# Patient Record
Sex: MALE | Race: WHITE | NOT HISPANIC OR LATINO | Employment: OTHER | RURAL
[De-identification: names, ages, dates, MRNs, and addresses within clinical notes are randomized per-mention and may not be internally consistent; named-entity substitution may affect disease eponyms.]

---

## 2023-09-26 ENCOUNTER — OUTSIDE FACILITY SERVICE (OUTPATIENT)
Dept: CARDIOLOGY | Facility: CLINIC | Age: 68
End: 2023-09-26
Payer: MEDICARE

## 2023-10-17 ENCOUNTER — OFFICE VISIT (OUTPATIENT)
Dept: CARDIOLOGY | Facility: CLINIC | Age: 68
End: 2023-10-17
Payer: MEDICARE

## 2023-10-17 ENCOUNTER — PREP FOR SURGERY (OUTPATIENT)
Dept: OTHER | Facility: HOSPITAL | Age: 68
End: 2023-10-17
Payer: MEDICARE

## 2023-10-17 VITALS
DIASTOLIC BLOOD PRESSURE: 86 MMHG | WEIGHT: 203 LBS | SYSTOLIC BLOOD PRESSURE: 132 MMHG | HEIGHT: 72 IN | HEART RATE: 87 BPM | BODY MASS INDEX: 27.5 KG/M2

## 2023-10-17 DIAGNOSIS — I35.0 AORTIC STENOSIS, SEVERE: Primary | ICD-10-CM

## 2023-10-17 PROCEDURE — 99204 OFFICE O/P NEW MOD 45 MIN: CPT | Performed by: INTERNAL MEDICINE

## 2023-10-17 PROCEDURE — 1159F MED LIST DOCD IN RCRD: CPT | Performed by: INTERNAL MEDICINE

## 2023-10-17 PROCEDURE — 93000 ELECTROCARDIOGRAM COMPLETE: CPT | Performed by: INTERNAL MEDICINE

## 2023-10-17 PROCEDURE — 1160F RVW MEDS BY RX/DR IN RCRD: CPT | Performed by: INTERNAL MEDICINE

## 2023-10-17 RX ORDER — SODIUM CHLORIDE 0.9 % (FLUSH) 0.9 %
10 SYRINGE (ML) INJECTION EVERY 12 HOURS SCHEDULED
OUTPATIENT
Start: 2023-10-17

## 2023-10-17 RX ORDER — SODIUM CHLORIDE 9 MG/ML
40 INJECTION, SOLUTION INTRAVENOUS AS NEEDED
OUTPATIENT
Start: 2023-10-17

## 2023-10-17 RX ORDER — SODIUM CHLORIDE 0.9 % (FLUSH) 0.9 %
10 SYRINGE (ML) INJECTION AS NEEDED
OUTPATIENT
Start: 2023-10-17

## 2023-10-17 NOTE — PROGRESS NOTES
"Chief Complaint  aortic valve stenosis     Subjective            Timo Aceves presents to [facility] CARDIOLOGY  History of Present Illness    68-year-old white male.  He has no previous cardiac problems.  He reestablish primary care recently and had a murmur on exam.  He had an echocardiogram thereafter which showed severe calcific aortic stenosis with peak velocity 4.8 m/s, mean gradient 57 mmHg with normal LV function.  He is very active and works full-time.  He denies excessive shortness of breath or chest pain.  No dizziness or syncope.  He is on no medications.  Non-smoker.    PMH  History reviewed. No pertinent past medical history.      SURGICALHX  History reviewed. No pertinent surgical history.     SOC  Social History     Socioeconomic History    Marital status:    Tobacco Use    Smoking status: Never    Smokeless tobacco: Never   Vaping Use    Vaping Use: Never used   Substance and Sexual Activity    Alcohol use: Yes     Comment: occ    Drug use: Never    Sexual activity: Defer         FAMHX  Family History   Problem Relation Age of Onset    No Known Problems Mother     No Known Problems Father           ALLERGY  No Known Allergies     MEDSCURRENT  No current outpatient medications on file.      Review of Systems   Constitutional: Negative. Negative for malaise/fatigue.   HENT: Negative.     Eyes: Negative.    Cardiovascular:  Negative for chest pain, dyspnea on exertion, near-syncope and syncope.   Respiratory: Negative.  Negative for shortness of breath.    Endocrine: Negative.    Hematologic/Lymphatic: Negative.    Skin: Negative.    Musculoskeletal: Negative.    Gastrointestinal: Negative.    Genitourinary: Negative.    Neurological: Negative.    Psychiatric/Behavioral: Negative.          Objective     /86   Pulse 87   Ht 182.9 cm (72\")   Wt 92.1 kg (203 lb)   BMI 27.53 kg/m²       General Appearance:   well developed  well nourished  HENT:   oropharynx " moist  lips not cyanotic  Neck:  thyroid not enlarged  supple  Respiratory:  no respiratory distress  normal breath sounds  no rales  Cardiovascular:  no jugular venous distention  regular rhythm  apical impulse normal  S1 normal, S2 normal  no S3, no S4   Harsh parasternal systolic murmur  no rub, no thrill  carotid pulses normal; no bruit  pedal pulses normal  lower extremity edema: none    Musculoskeletal:  no clubbing of fingers.   normocephalic, head atraumatic  Skin:   warm, dry  Psychiatric:  judgement and insight appropriate  normal mood and affect      Result Review :             Data reviewed : Primary care records reviewed, laboratory studies reviewed, echo reviewed        ECG 12 Lead    Date/Time: 10/17/2023 10:16 AM  Performed by: MAVIS Hwang MD    Authorized by: MAVIS Hwang MD  Comparison: not compared with previous ECG   Previous ECG: no previous ECG available  Rhythm: sinus rhythm  Conduction: conduction normal  ST Segments: ST segments normal  T Waves: T waves normal  QRS axis: normal  Other findings: left ventricular hypertrophy    Clinical impression: non-specific ECG               Assessment and Plan        ASSESSMENT:  Encounter Diagnosis   Name Primary?    Aortic stenosis, severe Yes         PLAN:    1.  Mr. Aceves has severe aortic stenosis, heavily calcified valve and a harsh murmur on exam.  He does not have specific symptoms at this point.  Given his relatively young age, and degree of physical activity, combined with the valve measurements I think he definitely will require aortic valve replacement in the future.  I discussed at length with the patient and his wife the natural history of aortic valve stenosis and symptoms/complications that can arise in the future due to the effects of aortic stenosis.  I recommend cardiac catheterization to evaluate for significant coronary artery disease and then have outpatient surgical consultation.  Ultimately the patient will decide the  timing of surgery.  He is not enthusiastic about having surgery yet of course given he is asymptomatic but he will eventually become symptomatic based on the measurements and active lifestyle.  2.  The risk and benefits of coronary angiography were discussed with the patient he is agreeable to proceed.  He wants to schedule this for November 2  3.  Further follow-up to be determined          Patient was given instructions and counseling regarding his condition or for health maintenance advice. Please see specific information pulled into the AVS if appropriate.             MAVIS Hwang MD  10/17/2023    10:15 EDT

## 2023-10-17 NOTE — H&P (VIEW-ONLY)
"Chief Complaint  aortic valve stenosis     Subjective            Timo Aceves presents to [facility] CARDIOLOGY  History of Present Illness    68-year-old white male.  He has no previous cardiac problems.  He reestablish primary care recently and had a murmur on exam.  He had an echocardiogram thereafter which showed severe calcific aortic stenosis with peak velocity 4.8 m/s, mean gradient 57 mmHg with normal LV function.  He is very active and works full-time.  He denies excessive shortness of breath or chest pain.  No dizziness or syncope.  He is on no medications.  Non-smoker.    PMH  History reviewed. No pertinent past medical history.      SURGICALHX  History reviewed. No pertinent surgical history.     SOC  Social History     Socioeconomic History    Marital status:    Tobacco Use    Smoking status: Never    Smokeless tobacco: Never   Vaping Use    Vaping Use: Never used   Substance and Sexual Activity    Alcohol use: Yes     Comment: occ    Drug use: Never    Sexual activity: Defer         FAMHX  Family History   Problem Relation Age of Onset    No Known Problems Mother     No Known Problems Father           ALLERGY  No Known Allergies     MEDSCURRENT  No current outpatient medications on file.      Review of Systems   Constitutional: Negative. Negative for malaise/fatigue.   HENT: Negative.     Eyes: Negative.    Cardiovascular:  Negative for chest pain, dyspnea on exertion, near-syncope and syncope.   Respiratory: Negative.  Negative for shortness of breath.    Endocrine: Negative.    Hematologic/Lymphatic: Negative.    Skin: Negative.    Musculoskeletal: Negative.    Gastrointestinal: Negative.    Genitourinary: Negative.    Neurological: Negative.    Psychiatric/Behavioral: Negative.          Objective     /86   Pulse 87   Ht 182.9 cm (72\")   Wt 92.1 kg (203 lb)   BMI 27.53 kg/m²       General Appearance:   well developed  well nourished  HENT:   oropharynx " moist  lips not cyanotic  Neck:  thyroid not enlarged  supple  Respiratory:  no respiratory distress  normal breath sounds  no rales  Cardiovascular:  no jugular venous distention  regular rhythm  apical impulse normal  S1 normal, S2 normal  no S3, no S4   Harsh parasternal systolic murmur  no rub, no thrill  carotid pulses normal; no bruit  pedal pulses normal  lower extremity edema: none    Musculoskeletal:  no clubbing of fingers.   normocephalic, head atraumatic  Skin:   warm, dry  Psychiatric:  judgement and insight appropriate  normal mood and affect      Result Review :             Data reviewed : Primary care records reviewed, laboratory studies reviewed, echo reviewed        ECG 12 Lead    Date/Time: 10/17/2023 10:16 AM  Performed by: MAVIS Hwang MD    Authorized by: MAVIS Hwang MD  Comparison: not compared with previous ECG   Previous ECG: no previous ECG available  Rhythm: sinus rhythm  Conduction: conduction normal  ST Segments: ST segments normal  T Waves: T waves normal  QRS axis: normal  Other findings: left ventricular hypertrophy    Clinical impression: non-specific ECG               Assessment and Plan        ASSESSMENT:  Encounter Diagnosis   Name Primary?    Aortic stenosis, severe Yes         PLAN:    1.  Mr. Aceves has severe aortic stenosis, heavily calcified valve and a harsh murmur on exam.  He does not have specific symptoms at this point.  Given his relatively young age, and degree of physical activity, combined with the valve measurements I think he definitely will require aortic valve replacement in the future.  I discussed at length with the patient and his wife the natural history of aortic valve stenosis and symptoms/complications that can arise in the future due to the effects of aortic stenosis.  I recommend cardiac catheterization to evaluate for significant coronary artery disease and then have outpatient surgical consultation.  Ultimately the patient will decide the  timing of surgery.  He is not enthusiastic about having surgery yet of course given he is asymptomatic but he will eventually become symptomatic based on the measurements and active lifestyle.  2.  The risk and benefits of coronary angiography were discussed with the patient he is agreeable to proceed.  He wants to schedule this for November 2  3.  Further follow-up to be determined          Patient was given instructions and counseling regarding his condition or for health maintenance advice. Please see specific information pulled into the AVS if appropriate.             MAVIS Hwang MD  10/17/2023    10:15 EDT

## 2023-10-30 ENCOUNTER — TELEPHONE (OUTPATIENT)
Dept: CARDIOLOGY | Facility: CLINIC | Age: 68
End: 2023-10-30
Payer: MEDICARE

## 2023-10-30 NOTE — TELEPHONE ENCOUNTER
I spoke to patient and gave an arrival time of 8:00 on 11/02/23 for [facility]. Patient was instructed to have a  for the day of the procedure and to arrive at the main entrance/registration area. Patient was educated about stent placement and informed that in the event of stent placement, the patient will be required to stay overnight for observation. Patient was instructed to continue all medications as usual but he is not currently taking any medications. Patient was instructed to be NPO after midnight with sips of water as needed. Patient is agreeable with no other questions or concerns.

## 2023-11-01 RX ORDER — MULTIPLE VITAMINS W/ MINERALS TAB 9MG-400MCG
1 TAB ORAL DAILY
COMMUNITY

## 2023-11-01 NOTE — PRE-PROCEDURE INSTRUCTIONS
PATIENT INSTRUCTED TO BE:    - NPO AFTER MIDNIGHT EXCEPT CAN HAVE SIPS OF WATER WITH HIS MEDICATION PRIOR TO PROCEDURE    -  INSTRUCTED NO LOTIONS, JEWELRY, PIERCINGS, OR DEODORANT DAY OF THE PROCEDURE    - INSTRUCTED TO TAKE THE FOLLOWING MEDICATIONS THE DAY OF SURGERY:       AS DIRECTED BY DR. SUERO    - INSTRUCTED PT TO FOLLOW ANY INSTRUCTIONS GIVEN BY HIS CARDIOLOGIST.    - INFORMED PT THEY ATTEMPT RADIAL APPROACH FIRST IF UNABLE TO PERFORM CATH WITH THAT APPROACH THEY WILL DO A FEMORAL APPROACH.  ALSO, INFORMED PT THEY WILL BE ON BEDREST POSTOP.  IF THE SURGEON FEELS  AN INTERVENTION OR STENTS NEEDS TO BE PLACED, HE/SHE WILL STAY OVER NIGHT FOR OBSERVATION AND MONITORING.     - INFORMED THE PATIENT HE CAN HAVE TWO VISITORS WITH HIM THE DAY OF THE PROCEDURE    - INSTRUCTED PT TO PARK IN THE PARKING GARAGE, ENTER THE HOSPITAL THRU ENTRANCE A AND  CHECK IN AT THE MAIN REGISTRATION DESK, AFTER REGISTRATION PT WILL BE TAKEN THE THE PREOP AREA FOR HIS PROCEDURE.    -ARRIVAL TIME GIVEN BY CARDIOLOGIST OFFICE, INFORMED PT IF ARRIVAL TIME CHANGES OR ADJUSTMENTS NEED TO BE MADE IN THEIR ARRIVAL TIME, HE/SHE WOULD RECEIVE A CALL.    -PT TO LABS DONE PRIOR TO PROCEDURE      - PATIENT VERBALIZED UNDERSTANDING

## 2023-11-02 ENCOUNTER — HOSPITAL ENCOUNTER (OUTPATIENT)
Facility: HOSPITAL | Age: 68
Setting detail: HOSPITAL OUTPATIENT SURGERY
Discharge: HOME | End: 2023-11-02
Attending: INTERNAL MEDICINE | Admitting: INTERNAL MEDICINE
Payer: MEDICARE

## 2023-11-02 ENCOUNTER — TELEPHONE (OUTPATIENT)
Dept: CARDIAC SURGERY | Facility: CLINIC | Age: 68
End: 2023-11-02
Payer: MEDICARE

## 2023-11-02 VITALS
RESPIRATION RATE: 18 BRPM | TEMPERATURE: 97.4 F | HEART RATE: 67 BPM | SYSTOLIC BLOOD PRESSURE: 109 MMHG | DIASTOLIC BLOOD PRESSURE: 69 MMHG | OXYGEN SATURATION: 97 % | BODY MASS INDEX: 25.73 KG/M2 | HEIGHT: 72 IN | WEIGHT: 190 LBS

## 2023-11-02 DIAGNOSIS — I35.0 AORTIC STENOSIS, SEVERE: ICD-10-CM

## 2023-11-02 LAB
DEPRECATED RDW RBC AUTO: 43.9 FL (ref 37–54)
ERYTHROCYTE [DISTWIDTH] IN BLOOD BY AUTOMATED COUNT: 13.5 % (ref 12.3–15.4)
HCT VFR BLD AUTO: 48.1 % (ref 37.5–51)
HGB BLD-MCNC: 15.8 G/DL (ref 13–17.7)
INR PPP: 0.97 (ref 0.86–1.15)
MCH RBC QN AUTO: 29.4 PG (ref 26.6–33)
MCHC RBC AUTO-ENTMCNC: 32.8 G/DL (ref 31.5–35.7)
MCV RBC AUTO: 89.6 FL (ref 79–97)
PLATELET # BLD AUTO: 229 10*3/MM3 (ref 140–450)
PMV BLD AUTO: 10.3 FL (ref 6–12)
PROTHROMBIN TIME: 13 SECONDS (ref 11.8–14.9)
RBC # BLD AUTO: 5.37 10*6/MM3 (ref 4.14–5.8)
WBC NRBC COR # BLD: 4.75 10*3/MM3 (ref 3.4–10.8)

## 2023-11-02 PROCEDURE — C1769 GUIDE WIRE: HCPCS | Performed by: INTERNAL MEDICINE

## 2023-11-02 PROCEDURE — 99152 MOD SED SAME PHYS/QHP 5/>YRS: CPT | Performed by: INTERNAL MEDICINE

## 2023-11-02 PROCEDURE — 25010000002 MIDAZOLAM PER 1MG: Performed by: INTERNAL MEDICINE

## 2023-11-02 PROCEDURE — 85610 PROTHROMBIN TIME: CPT | Performed by: INTERNAL MEDICINE

## 2023-11-02 PROCEDURE — 93454 CORONARY ARTERY ANGIO S&I: CPT | Performed by: INTERNAL MEDICINE

## 2023-11-02 PROCEDURE — C1894 INTRO/SHEATH, NON-LASER: HCPCS | Performed by: INTERNAL MEDICINE

## 2023-11-02 PROCEDURE — 94761 N-INVAS EAR/PLS OXIMETRY MLT: CPT

## 2023-11-02 PROCEDURE — 25010000002 HEPARIN (PORCINE) PER 1000 UNITS: Performed by: INTERNAL MEDICINE

## 2023-11-02 PROCEDURE — 85027 COMPLETE CBC AUTOMATED: CPT | Performed by: INTERNAL MEDICINE

## 2023-11-02 PROCEDURE — 25510000001 IOPAMIDOL PER 1 ML: Performed by: INTERNAL MEDICINE

## 2023-11-02 PROCEDURE — 25010000002 FENTANYL CITRATE (PF) 100 MCG/2ML SOLUTION: Performed by: INTERNAL MEDICINE

## 2023-11-02 RX ORDER — NITROGLYCERIN 0.4 MG/1
0.4 TABLET SUBLINGUAL
Status: DISCONTINUED | OUTPATIENT
Start: 2023-11-02 | End: 2023-11-02 | Stop reason: HOSPADM

## 2023-11-02 RX ORDER — SODIUM CHLORIDE 0.9 % (FLUSH) 0.9 %
10 SYRINGE (ML) INJECTION AS NEEDED
Status: DISCONTINUED | OUTPATIENT
Start: 2023-11-02 | End: 2023-11-02 | Stop reason: HOSPADM

## 2023-11-02 RX ORDER — MIDAZOLAM HYDROCHLORIDE 2 MG/2ML
INJECTION, SOLUTION INTRAMUSCULAR; INTRAVENOUS
Status: DISCONTINUED | OUTPATIENT
Start: 2023-11-02 | End: 2023-11-02 | Stop reason: HOSPADM

## 2023-11-02 RX ORDER — VERAPAMIL HYDROCHLORIDE 2.5 MG/ML
INJECTION, SOLUTION INTRAVENOUS
Status: DISCONTINUED | OUTPATIENT
Start: 2023-11-02 | End: 2023-11-02 | Stop reason: HOSPADM

## 2023-11-02 RX ORDER — FENTANYL CITRATE 50 UG/ML
INJECTION, SOLUTION INTRAMUSCULAR; INTRAVENOUS
Status: DISCONTINUED | OUTPATIENT
Start: 2023-11-02 | End: 2023-11-02 | Stop reason: HOSPADM

## 2023-11-02 RX ORDER — LIDOCAINE HYDROCHLORIDE 20 MG/ML
INJECTION, SOLUTION INFILTRATION; PERINEURAL
Status: DISCONTINUED | OUTPATIENT
Start: 2023-11-02 | End: 2023-11-02 | Stop reason: HOSPADM

## 2023-11-02 RX ORDER — HEPARIN SODIUM 1000 [USP'U]/ML
INJECTION, SOLUTION INTRAVENOUS; SUBCUTANEOUS
Status: DISCONTINUED | OUTPATIENT
Start: 2023-11-02 | End: 2023-11-02 | Stop reason: HOSPADM

## 2023-11-02 RX ORDER — ACETAMINOPHEN 325 MG/1
650 TABLET ORAL EVERY 4 HOURS PRN
Status: DISCONTINUED | OUTPATIENT
Start: 2023-11-02 | End: 2023-11-02 | Stop reason: HOSPADM

## 2023-11-02 RX ORDER — SODIUM CHLORIDE 0.9 % (FLUSH) 0.9 %
10 SYRINGE (ML) INJECTION EVERY 12 HOURS SCHEDULED
Status: DISCONTINUED | OUTPATIENT
Start: 2023-11-02 | End: 2023-11-02 | Stop reason: HOSPADM

## 2023-11-02 RX ORDER — SODIUM CHLORIDE 9 MG/ML
40 INJECTION, SOLUTION INTRAVENOUS AS NEEDED
Status: DISCONTINUED | OUTPATIENT
Start: 2023-11-02 | End: 2023-11-02 | Stop reason: HOSPADM

## 2023-11-02 NOTE — NURSING NOTE
Patient came back from procedure stable. Patient was monitored per orders. VSS. Patient and spouse were educated on discharge instructions. Patient and spouse verbalized understanding. IV removed.

## 2023-11-02 NOTE — TELEPHONE ENCOUNTER
Spoke with pt tried to schedule an appointment with Dr. Vincent for 11/07/2023, pt did not want to schedule at this time. Asked me to call back in a week and he would discuss then.

## 2023-11-02 NOTE — Clinical Note
A 6 fr sheath was  inserted into the right radial artery. Sheath insertion not delayed.
All Patches/Pads removed. Skin Intact.
Allergies reviewed.  H&P note has been confirmed for the patient. Procedural consent has been signed.  Staff has reviewed the patient's labs.
Catheter inserted with wire simultaneously.
Hemostasis started on the right radial artery. R-Band was used in achieving hemostasis. Radial compression device applied to vessel. Hemostasis achieved successfully.
Inserted under fluoro.
Post-Procedural Saturation was taken from the Left Hand. Sat result is 99%.
Prepped: right groin and Right Wrist. Prepped with: ChloraPrep. The site was clipped. The patient was draped in a sterile fashion.
Report was  verbally given at bedside. .
The left coronary artery was selectively engaged, injected and visualized.
The physician has confirmed that the patient has been reassessed and is appropriate for moderate sedation
The right coronary artery was selectively engaged, injected and visualized.
The right radial pulse is +2. The right ulnar pulse is +2.
The right radial pulse is +2. The right ulnar pulse is +2.
catheter removed  over the wire.
removed.
removed.
No

## 2023-11-07 ENCOUNTER — OFFICE VISIT (OUTPATIENT)
Dept: CARDIAC SURGERY | Facility: CLINIC | Age: 68
End: 2023-11-07
Payer: MEDICARE

## 2023-11-07 VITALS
HEART RATE: 74 BPM | TEMPERATURE: 97.3 F | RESPIRATION RATE: 20 BRPM | OXYGEN SATURATION: 100 % | DIASTOLIC BLOOD PRESSURE: 85 MMHG | WEIGHT: 190 LBS | SYSTOLIC BLOOD PRESSURE: 128 MMHG | BODY MASS INDEX: 25.73 KG/M2 | HEIGHT: 72 IN

## 2023-11-07 DIAGNOSIS — I35.0 AORTIC STENOSIS, SEVERE: Primary | ICD-10-CM

## 2023-11-07 PROCEDURE — 1160F RVW MEDS BY RX/DR IN RCRD: CPT | Performed by: THORACIC SURGERY (CARDIOTHORACIC VASCULAR SURGERY)

## 2023-11-07 PROCEDURE — 99204 OFFICE O/P NEW MOD 45 MIN: CPT | Performed by: THORACIC SURGERY (CARDIOTHORACIC VASCULAR SURGERY)

## 2023-11-07 PROCEDURE — 1159F MED LIST DOCD IN RCRD: CPT | Performed by: THORACIC SURGERY (CARDIOTHORACIC VASCULAR SURGERY)

## 2023-11-07 RX ORDER — CHLORAL HYDRATE 500 MG
1200 CAPSULE ORAL
COMMUNITY

## 2023-11-07 NOTE — LETTER
November 8, 2023       No Recipients    Patient: Timo Aceves   YOB: 1955   Date of Visit: 11/7/2023     Dear Alexander Bancroft George, MD:       Thank you for referring Timo Aceves to me for evaluation. Below are the relevant portions of my assessment and plan of care.    If you have questions, please do not hesitate to call me. I look forward to following Timo along with you.         Sincerely,        Jordan Vincent MD        CC:   No Recipients    Jordan Vincent MD  11/08/23 1548  Sign when Signing Visit  11/8/2023    Seen on 11/7/2023    Chief Complaint     Fatigue, evaluation of aortic stenosis    History of Present Illness:       Dear Andrea and Colleagues,  It was nice to see Timo Aceves in consultation at your request. He is a 68 y.o. male with hyperlipidemia, knee arthrosis and a heart murmur who was evaluated with an echocardiogram that shows severe calcific aortic stenosis.  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  He denies chest pain or dizziness or syncope but when I asked him in detail if he gets short of breath when he walks 1 or 2 flight of stairs he needs to stop because he fatigues.. He denies syncope, TIA, orthopnea or PND or lower extremity edema.  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  His echocardiogram showed significant calcification of the aortic valve with a mean gradient of 57 mmHg and a peak velocity of 4.9 m/s with an aortic valve area less than 1 cm².  He has a normal LV and no other associated valve disease.  His cardiac cath showed no significant coronary artery disease.  He has no history of endocarditis, scarlet or rheumatic fever or IV drug use.  He has no family history of aneurysms, dissections or connective tissue disorders.      Patient Active Problem List   Diagnosis   • Aortic stenosis, severe       Past Medical History:   Diagnosis Date   • Coronary artery disease      AORTIC STENOSIS   • Elevated cholesterol    • Hyperlipidemia        Past Surgical History:   Procedure Laterality Date   • CARDIAC CATHETERIZATION N/A 11/2/2023    Procedure: Coronaries only;  Surgeon: MAVIS Hwang MD;  Location: [facility] INVASIVE LOCATION;  Service: Cardiology;  Laterality: N/A;   • ORIF CLAVICLE FRACTURE Right        No Known Allergies      Current Outpatient Medications:   •  Ascorbic Acid (VITAMIN C ER PO), Take  by mouth Daily., Disp: , Rfl:   •  multivitamin with minerals tablet tablet, Take 1 tablet by mouth Daily., Disp: , Rfl:   •  Omega-3 Fatty Acids (fish oil) 1000 MG capsule capsule, Take 1,200 mg by mouth Daily With Breakfast., Disp: , Rfl:     Social History     Socioeconomic History   • Marital status:    Tobacco Use   • Smoking status: Never   • Smokeless tobacco: Never   Vaping Use   • Vaping Use: Never used   Substance and Sexual Activity   • Alcohol use: Yes     Comment: occ   • Drug use: Never   • Sexual activity: Defer       Family History   Problem Relation Age of Onset   • No Known Problems Mother    • No Known Problems Father    • Aneurysm Paternal Uncle    • Malig Hyperthermia Neg Hx      Review of Systems  As HPI, otherwise noncontributory    Physical Exam:    Vital Signs:  Weight: 86.2 kg (190 lb)   Body mass index is 25.77 kg/m².  Temp: 97.3 °F (36.3 °C)   Heart Rate: 74   BP: 128/85     Constitutional:       Appearance: Well-developed.   Eyes:      Conjunctiva/sclera: Conjunctivae normal.      Pupils: Pupils are equal, round, and reactive to light.   HENT:      Head: Normocephalic and atraumatic.      Nose: Nose normal.   Neck:      Thyroid: No thyromegaly.      Vascular: No JVD.      Lymphadenopathy: No cervical adenopathy.   Pulmonary:      Effort: Pulmonary effort is normal.      Breath sounds: Normal breath sounds. No rales.   Cardiovascular:      Normal rate. Regular rhythm.      Murmurs: There is a harsh midsystolic murmur at the URSB, radiating to the  neck.      No gallop.    Pulses:     Intact distal pulses. No decreased pulses.   Edema:     Peripheral edema absent.   Abdominal:      General: Bowel sounds are normal. There is no distension.      Palpations: Abdomen is soft. There is no abdominal mass.      Tenderness: There is no abdominal tenderness.   Musculoskeletal: Normal range of motion.         General: No tenderness or deformity.      Cervical back: Normal range of motion and neck supple. Skin:     General: Skin is warm and dry.      Findings: No erythema or rash.   Neurological:      Mental Status: Alert and oriented to person, place, and time.      Deep Tendon Reflexes: Reflexes are normal and symmetric.   Psychiatric:         Behavior: Behavior normal.          Assessment:     Problems Addressed this Visit          Cardiac and Vasculature    Aortic stenosis, severe - Primary    Relevant Orders    Complete PFT - Pre & Post Bronchodilator    Hemoglobin    Blood Gas, Arterial -     Diagnoses         Codes Comments    Aortic stenosis, severe    -  Primary ICD-10-CM: I35.0  ICD-9-CM: 424.1             Assessment/recommendation:     Severe nonrheumatic aortic stenosis.  He has mild symptoms of fatigue and may be some dyspnea.  Otherwise he has not had major comorbidities.  He has a very low surgical risk.  He has an STS risk of less than 1%.  I recommend SAVR over TAVR to prolong survival, symptomatic relief and to prevent adverse events.  Discussed with him my recommendation of biologic aortic valve replacement through a mini sternotomy.  I discussed in detail the procedure, risk and benefits, the recommendation of biologic prosthesis and the possible bridge to a TAVR if need be in the next 10 to 20 years.  Verbalized understanding he wishes to discuss further with his wife and find a time for surgery most likely in January      Thank you for allowing me to participate in his care.    Regards,    Jordan Vincent M.D.    I spent over 45 minutes before,  during and after the office visit in reviewing the records, examining the patient, reviewing and interpreting myself the cardiac cath and echocardiogram, discussing the findings and options, discussed my recommendation of surgery and the guidelines for intervention and spent time in documenting in the electronic record

## 2023-11-08 NOTE — PROGRESS NOTES
11/8/2023    Seen on 11/7/2023    Chief Complaint     Fatigue, evaluation of aortic stenosis    History of Present Illness:       Dear Andrea and Colleagues,  It was nice to see Timo Aceves in consultation at your request. He is a 68 y.o. male with hyperlipidemia, knee arthrosis and a heart murmur who was evaluated with an echocardiogram that shows severe calcific aortic stenosis.  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  He denies chest pain or dizziness or syncope but when I asked him in detail if he gets short of breath when he walks 1 or 2 flight of stairs he needs to stop because he fatigues.. He denies syncope, TIA, orthopnea or PND or lower extremity edema.  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  His echocardiogram showed significant calcification of the aortic valve with a mean gradient of 57 mmHg and a peak velocity of 4.9 m/s with an aortic valve area less than 1 cm².  He has a normal LV and no other associated valve disease.  His cardiac cath showed no significant coronary artery disease.  He has no history of endocarditis, scarlet or rheumatic fever or IV drug use.  He has no family history of aneurysms, dissections or connective tissue disorders.      Patient Active Problem List   Diagnosis    Aortic stenosis, severe       Past Medical History:   Diagnosis Date    Coronary artery disease     AORTIC STENOSIS    Elevated cholesterol     Hyperlipidemia        Past Surgical History:   Procedure Laterality Date    CARDIAC CATHETERIZATION N/A 11/2/2023    Procedure: Coronaries only;  Surgeon: MAVIS Hwang MD;  Location: [facility] CATH INVASIVE LOCATION;  Service: Cardiology;  Laterality: N/A;    ORIF CLAVICLE FRACTURE Right        No Known Allergies      Current Outpatient Medications:     Ascorbic Acid (VITAMIN C ER PO), Take  by mouth Daily., Disp: , Rfl:     multivitamin with minerals tablet tablet, Take 1 tablet by mouth Daily., Disp: ,  Rfl:     Omega-3 Fatty Acids (fish oil) 1000 MG capsule capsule, Take 1,200 mg by mouth Daily With Breakfast., Disp: , Rfl:     Social History     Socioeconomic History    Marital status:    Tobacco Use    Smoking status: Never    Smokeless tobacco: Never   Vaping Use    Vaping Use: Never used   Substance and Sexual Activity    Alcohol use: Yes     Comment: occ    Drug use: Never    Sexual activity: Defer       Family History   Problem Relation Age of Onset    No Known Problems Mother     No Known Problems Father     Aneurysm Paternal Uncle     Malig Hyperthermia Neg Hx      Review of Systems  As HPI, otherwise noncontributory    Physical Exam:    Vital Signs:  Weight: 86.2 kg (190 lb)   Body mass index is 25.77 kg/m².  Temp: 97.3 °F (36.3 °C)   Heart Rate: 74   BP: 128/85     Constitutional:       Appearance: Well-developed.   Eyes:      Conjunctiva/sclera: Conjunctivae normal.      Pupils: Pupils are equal, round, and reactive to light.   HENT:      Head: Normocephalic and atraumatic.      Nose: Nose normal.   Neck:      Thyroid: No thyromegaly.      Vascular: No JVD.      Lymphadenopathy: No cervical adenopathy.   Pulmonary:      Effort: Pulmonary effort is normal.      Breath sounds: Normal breath sounds. No rales.   Cardiovascular:      Normal rate. Regular rhythm.      Murmurs: There is a harsh midsystolic murmur at the URSB, radiating to the neck.      No gallop.    Pulses:     Intact distal pulses. No decreased pulses.   Edema:     Peripheral edema absent.   Abdominal:      General: Bowel sounds are normal. There is no distension.      Palpations: Abdomen is soft. There is no abdominal mass.      Tenderness: There is no abdominal tenderness.   Musculoskeletal: Normal range of motion.         General: No tenderness or deformity.      Cervical back: Normal range of motion and neck supple. Skin:     General: Skin is warm and dry.      Findings: No erythema or rash.   Neurological:      Mental Status:  Alert and oriented to person, place, and time.      Deep Tendon Reflexes: Reflexes are normal and symmetric.   Psychiatric:         Behavior: Behavior normal.          Assessment:     Problems Addressed this Visit          Cardiac and Vasculature    Aortic stenosis, severe - Primary    Relevant Orders    Complete PFT - Pre & Post Bronchodilator    Hemoglobin    Blood Gas, Arterial -     Diagnoses         Codes Comments    Aortic stenosis, severe    -  Primary ICD-10-CM: I35.0  ICD-9-CM: 424.1             Assessment/recommendation:     Severe nonrheumatic aortic stenosis.  He has mild symptoms of fatigue and may be some dyspnea.  Otherwise he has not had major comorbidities.  He has a very low surgical risk.  He has an STS risk of less than 1%.  I recommend SAVR over TAVR to prolong survival, symptomatic relief and to prevent adverse events.  Discussed with him my recommendation of biologic aortic valve replacement through a mini sternotomy.  I discussed in detail the procedure, risk and benefits, the recommendation of biologic prosthesis and the possible bridge to a TAVR if need be in the next 10 to 20 years.  Verbalized understanding he wishes to discuss further with his wife and find a time for surgery most likely in January      Thank you for allowing me to participate in his care.    Regards,    Jordan Vincent M.D.    I spent over 45 minutes before, during and after the office visit in reviewing the records, examining the patient, reviewing and interpreting myself the cardiac cath and echocardiogram, discussing the findings and options, discussed my recommendation of surgery and the guidelines for intervention and spent time in documenting in the electronic record

## 2023-11-22 ENCOUNTER — HOSPITAL ENCOUNTER (OUTPATIENT)
Dept: RESPIRATORY THERAPY | Facility: HOSPITAL | Age: 68
Discharge: HOME | End: 2023-11-22
Admitting: THORACIC SURGERY (CARDIOTHORACIC VASCULAR SURGERY)
Payer: MEDICARE

## 2023-11-22 DIAGNOSIS — I35.0 AORTIC STENOSIS, SEVERE: ICD-10-CM

## 2023-11-22 PROCEDURE — 94729 DIFFUSING CAPACITY: CPT

## 2023-11-22 PROCEDURE — 94726 PLETHYSMOGRAPHY LUNG VOLUMES: CPT

## 2023-11-22 PROCEDURE — 94060 EVALUATION OF WHEEZING: CPT

## 2023-11-22 RX ORDER — ALBUTEROL SULFATE 2.5 MG/3ML
2.5 SOLUTION RESPIRATORY (INHALATION) ONCE AS NEEDED
Status: COMPLETED | OUTPATIENT
Start: 2023-11-22 | End: 2023-11-22

## 2023-11-22 RX ADMIN — ALBUTEROL SULFATE 2.5 MG: 2.5 SOLUTION RESPIRATORY (INHALATION) at 15:45

## 2023-12-18 ENCOUNTER — OFFICE VISIT (OUTPATIENT)
Dept: ORTHOPEDIC SURGERY | Facility: CLINIC | Age: 68
End: 2023-12-18
Payer: MEDICARE

## 2023-12-18 ENCOUNTER — TELEPHONE (OUTPATIENT)
Dept: CARDIOLOGY | Facility: CLINIC | Age: 68
End: 2023-12-18
Payer: MEDICARE

## 2023-12-18 ENCOUNTER — PREP FOR SURGERY (OUTPATIENT)
Dept: OTHER | Facility: HOSPITAL | Age: 68
End: 2023-12-18
Payer: MEDICARE

## 2023-12-18 VITALS
SYSTOLIC BLOOD PRESSURE: 146 MMHG | DIASTOLIC BLOOD PRESSURE: 85 MMHG | BODY MASS INDEX: 25.73 KG/M2 | HEART RATE: 86 BPM | WEIGHT: 190 LBS | HEIGHT: 72 IN | OXYGEN SATURATION: 97 %

## 2023-12-18 DIAGNOSIS — S83.231A COMPLEX TEAR OF MEDIAL MENISCUS OF RIGHT KNEE AS CURRENT INJURY, INITIAL ENCOUNTER: Primary | ICD-10-CM

## 2023-12-18 DIAGNOSIS — S83.231A COMPLEX TEAR OF MEDIAL MENISCUS OF RIGHT KNEE AS CURRENT INJURY, INITIAL ENCOUNTER: ICD-10-CM

## 2023-12-18 DIAGNOSIS — M25.561 RIGHT KNEE PAIN, UNSPECIFIED CHRONICITY: Primary | ICD-10-CM

## 2023-12-18 PROBLEM — M23.321 MEDIAL MENISCUS, POSTERIOR HORN DERANGEMENT, RIGHT: Status: ACTIVE | Noted: 2023-12-18

## 2023-12-18 RX ORDER — CEFAZOLIN SODIUM IN 0.9 % NACL 3 G/100 ML
3 INTRAVENOUS SOLUTION, PIGGYBACK (ML) INTRAVENOUS ONCE
OUTPATIENT
Start: 2023-12-18 | End: 2023-12-18

## 2023-12-18 RX ORDER — CEFAZOLIN SODIUM 2 G/100ML
2 INJECTION, SOLUTION INTRAVENOUS ONCE
OUTPATIENT
Start: 2023-12-18 | End: 2023-12-18

## 2023-12-18 NOTE — TELEPHONE ENCOUNTER
Procedure: R Knee Scope with Medial Mensicus Repair    Med Directive: NA    PMH: Severe Aortic stenosis    Last Seen: 10/17/23

## 2023-12-18 NOTE — TELEPHONE ENCOUNTER
Patient is currently undergoing preoperative workup for upcoming aortic valve replacement that is going to be scheduled in January according to CT surgeon's last note.  Would advise patient to hold on the elective knee scope and meniscus repair until after his valve surgery.

## 2023-12-18 NOTE — PROGRESS NOTES
"Chief Complaint  Initial Evaluation of the Right Knee     Subjective      Timo Aceves presents to [facility] for initial evaluation of the right knee. He has had pain in the right knee for awhile.  The pain is on the medial aspect of the knee.  He uses a brace at times for support.  He has had pain for 5-6 weeks.  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  He has difficulty with stairs. He has pain with twisting and turning of the right knee. He has severe aortic stenosis. He needs cardiac clearance.      No Known Allergies     Social History     Socioeconomic History    Marital status:    Tobacco Use    Smoking status: Never    Smokeless tobacco: Never   Vaping Use    Vaping Use: Never used   Substance and Sexual Activity    Alcohol use: Yes     Comment: occ    Drug use: Never    Sexual activity: Defer        I reviewed the patient's chief complaint, history of present illness, review of systems, past medical history, surgical history, family history, social history, medications, and allergy list.     Review of Systems     Constitutional: Denies fevers, chills, weight loss  Cardiovascular: Denies chest pain, shortness of breath  Skin: Denies rashes, acute skin changes  Neurologic: Denies headache, loss of consciousness        Vital Signs:   /85   Pulse 86   Ht 182.9 cm (72\")   Wt 86.2 kg (190 lb)   SpO2 97%   BMI 25.77 kg/m²          Physical Exam  General: Alert. No acute distress    Ortho Exam        RIGHT KNEE Flexion 125. Extension 0. Stable to varus/valgus stress. Stable to anterior/posterior drawer. Neurovascularly intact. Positive Nesha. Negative Lachman. Positive EHL, FHL, HS and TA. Sensation intact to light touch all 5 nerves of the foot. Ambulates with Antalgic gait. Patella is well tracking. Calf supple, non-tender. Positive tenderness to the medial joint line. Negative tenderness to the lateral joint line. Negative " Crepitus. Good strength to hamstrings, quadriceps, dorsiflexors, and plantar flexors.  Knee Extensor Mechanism intact       Procedures        Imaging Results (Most Recent)       Procedure Component Value Units Date/Time    XR Knee 3 View Right [[id number]] Resulted: 12/18/23 1008     Updated: 12/18/23 1008             Result Review :        X-Ray Report:  Right knee X-Ray  Indication: Evaluation of the right knee  AP/Lateral and [place] view(s)  Findings: Mild arthritis.   Prior studies available for comparison: no      MRI 12/18/23  Artifact limiting evaluation anteriorly.   Complex tear of the body and posterior horn of medial meniscus as described.   Varying degrees of tricompartmental chondrosis most severe in the patella with joint effusion and very mild synovitis.   Nonspecific cystic structure partially visualized between the proximal tibia and fibula       Assessment and Plan     Diagnoses and all orders for this visit:    1. Right knee pain, unspecified chronicity (Primary)  -     XR Knee 3 View Right    2. tear of medial meniscus of right knee as current injury, initial encounter        Discussed the treatment plan with the patient. I reviewed the X-rays that were obtained today with the patient. I reviewed the MRI results with the patient.     Discussed the treatment options with the patient, operative vs non-operative. The patient will benefit from a right knee arthroscopy at this time whenever he is ready.     The patient expressed understanding and wished to proceed with a Right knee arthroscopy, partial medial meniscectomy, possible chondroplasty     Discussed surgery., Risks/benefits discussed with patient including, but not limited to: infection, bleeding, neurovascular damage, malunion, nonunion, aesthetic deformity, need for further surgery, and death., Surgery pamphlet given., and Call or return if worsening symptoms.    Follow Up     2 weeks postoperatively       Patient was given instructions  and counseling regarding his condition or for health maintenance advice. Please see specific information pulled into the AVS if appropriate.     Scribed for Mikhail Denney MD by Lita Gordon MA.  12/18/23   10:02 EST      I have personally performed the services described in this document as scribed by the above individual and it is both accurate and complete. Mikhail Denney MD 12/18/23

## 2023-12-19 NOTE — TELEPHONE ENCOUNTER
THANKS.  PATIENT WAS CONTACTED AND INFORMED THAT KNEE SURGERY WOULD BE CANCELED UNTIL AFTER VALUE SURGERY AND CARDIAC CLEARANCE COULD BE OBTAINED.  PATIENT VOICES UNDERSTANDING.  [facility] SCHEDULER WAS INFORMED.

## 2024-01-24 ENCOUNTER — PREP FOR SURGERY (OUTPATIENT)
Dept: OTHER | Facility: HOSPITAL | Age: 69
End: 2024-01-24
Payer: MEDICARE

## 2024-01-24 DIAGNOSIS — R79.1 ABNORMAL COAGULATION PROFILE: ICD-10-CM

## 2024-01-24 DIAGNOSIS — R79.9 ABNORMAL FINDING OF BLOOD CHEMISTRY, UNSPECIFIED: ICD-10-CM

## 2024-01-24 DIAGNOSIS — I50.32 CHRONIC DIASTOLIC (CONGESTIVE) HEART FAILURE: ICD-10-CM

## 2024-01-24 DIAGNOSIS — I79.8 OTHER DISORDERS OF ARTERIES, ARTERIOLES AND CAPILLARIES IN DISEASES CLASSIFIED ELSEWHERE: ICD-10-CM

## 2024-01-24 DIAGNOSIS — I35.0 AORTIC STENOSIS, SEVERE: Primary | ICD-10-CM

## 2024-01-24 RX ORDER — CHLORHEXIDINE GLUCONATE 500 MG/1
CLOTH TOPICAL EVERY 12 HOURS PRN
OUTPATIENT
Start: 2024-01-24

## 2024-01-24 RX ORDER — CHLORHEXIDINE GLUCONATE ORAL RINSE 1.2 MG/ML
15 SOLUTION DENTAL ONCE
OUTPATIENT
Start: 2024-01-24 | End: 2024-01-24

## 2024-01-24 RX ORDER — CHLORHEXIDINE GLUCONATE ORAL RINSE 1.2 MG/ML
15 SOLUTION DENTAL EVERY 12 HOURS
OUTPATIENT
Start: 2024-01-24 | End: 2024-01-25

## 2024-01-24 RX ORDER — CEFAZOLIN SODIUM 2 G/100ML
2000 INJECTION, SOLUTION INTRAVENOUS ONCE
OUTPATIENT
Start: 2024-01-24 | End: 2024-01-24

## 2024-01-25 ENCOUNTER — TELEPHONE (OUTPATIENT)
Dept: CARDIAC SURGERY | Facility: CLINIC | Age: 69
End: 2024-01-25
Payer: MEDICARE

## 2024-01-25 NOTE — TELEPHONE ENCOUNTER
Spoke to patient with PAT and surgery details. PAT is on 2-9-2024 at 0830 with any testing to follow. Surgery scheduled for 2- as s to follow case, arrival time is 0600. He is to stop his FISH OIL 10 days prior to surgery. He expressed verbal understanding of these instructions. He has some other questions and I will have Brii reach out to patient.

## 2024-01-26 ENCOUNTER — TELEPHONE (OUTPATIENT)
Dept: CARDIOLOGY | Facility: CLINIC | Age: 69
End: 2024-01-26
Payer: MEDICARE

## 2024-01-26 NOTE — TELEPHONE ENCOUNTER
Received VM from patient stating he had questions about upcoming valve surgery.    Attempted to call. No answer. Unable to leave VM due to mailbox full

## 2024-01-30 ENCOUNTER — TELEPHONE (OUTPATIENT)
Dept: CARDIOLOGY | Facility: CLINIC | Age: 69
End: 2024-01-30
Payer: MEDICARE

## 2024-01-30 NOTE — TELEPHONE ENCOUNTER
Pt called in and states he will be having surgery in two weeks and would like to  know if you think he should get his flu shot before then, he has not had one.

## 2024-01-30 NOTE — TELEPHONE ENCOUNTER
Patient came in office and wanted to know if having a flu shot before or after vascular surgery.  Dr Hwang addressed this and stated he should have one.  Patient wanted to know if surgery was necessary and Dr Hwang said yes, that he must do surgery because it is severe.

## 2024-02-09 ENCOUNTER — PRE-ADMISSION TESTING (OUTPATIENT)
Dept: PREADMISSION TESTING | Facility: HOSPITAL | Age: 69
End: 2024-02-09
Payer: MEDICARE

## 2024-02-09 ENCOUNTER — HOSPITAL ENCOUNTER (OUTPATIENT)
Dept: GENERAL RADIOLOGY | Facility: HOSPITAL | Age: 69
Discharge: HOME | End: 2024-02-09
Payer: MEDICARE

## 2024-02-09 ENCOUNTER — HOSPITAL ENCOUNTER (OUTPATIENT)
Dept: CARDIOLOGY | Facility: HOSPITAL | Age: 69
Discharge: HOME | End: 2024-02-09
Payer: MEDICARE

## 2024-02-09 VITALS
WEIGHT: 201 LBS | HEART RATE: 101 BPM | SYSTOLIC BLOOD PRESSURE: 133 MMHG | BODY MASS INDEX: 27.22 KG/M2 | OXYGEN SATURATION: 95 % | TEMPERATURE: 96.9 F | HEIGHT: 72 IN | DIASTOLIC BLOOD PRESSURE: 85 MMHG | RESPIRATION RATE: 20 BRPM

## 2024-02-09 DIAGNOSIS — S83.231A COMPLEX TEAR OF MEDIAL MENISCUS OF RIGHT KNEE AS CURRENT INJURY, INITIAL ENCOUNTER: ICD-10-CM

## 2024-02-09 DIAGNOSIS — I50.32 CHRONIC DIASTOLIC (CONGESTIVE) HEART FAILURE: ICD-10-CM

## 2024-02-09 DIAGNOSIS — I35.0 AORTIC STENOSIS, SEVERE: ICD-10-CM

## 2024-02-09 DIAGNOSIS — R79.9 ABNORMAL FINDING OF BLOOD CHEMISTRY, UNSPECIFIED: ICD-10-CM

## 2024-02-09 DIAGNOSIS — R79.1 ABNORMAL COAGULATION PROFILE: ICD-10-CM

## 2024-02-09 DIAGNOSIS — I79.8 OTHER DISORDERS OF ARTERIES, ARTERIOLES AND CAPILLARIES IN DISEASES CLASSIFIED ELSEWHERE: ICD-10-CM

## 2024-02-09 LAB
ABO GROUP BLD: NORMAL
ALBUMIN SERPL-MCNC: 4.2 G/DL (ref 3.5–5.2)
ALBUMIN/GLOB SERPL: 1.4 G/DL
ALP SERPL-CCNC: 105 U/L (ref 39–117)
ALT SERPL W P-5'-P-CCNC: 25 U/L (ref 1–41)
ANION GAP SERPL CALCULATED.3IONS-SCNC: 9.7 MMOL/L (ref 5–15)
APTT PPP: 29.5 SECONDS (ref 22.7–35.4)
ARTERIAL PATENCY WRIST A: POSITIVE
AST SERPL-CCNC: 27 U/L (ref 1–40)
ATMOSPHERIC PRESS: 749.3 MMHG
BASE EXCESS BLDA CALC-SCNC: 0.4 MMOL/L (ref 0–2)
BASOPHILS # BLD AUTO: 0.04 10*3/MM3 (ref 0–0.2)
BASOPHILS NFR BLD AUTO: 0.9 % (ref 0–1.5)
BDY SITE: NORMAL
BH CV XLRA MEAS LEFT DIST CCA EDV: -24.6 CM/SEC
BH CV XLRA MEAS LEFT DIST CCA PSV: -83.3 CM/SEC
BH CV XLRA MEAS LEFT DIST ICA EDV: -22.8 CM/SEC
BH CV XLRA MEAS LEFT DIST ICA PSV: -64 CM/SEC
BH CV XLRA MEAS LEFT ICA/CCA RATIO: 0.81
BH CV XLRA MEAS LEFT MID ICA EDV: -31 CM/SEC
BH CV XLRA MEAS LEFT MID ICA PSV: -66 CM/SEC
BH CV XLRA MEAS LEFT PROX CCA EDV: 21.7 CM/SEC
BH CV XLRA MEAS LEFT PROX CCA PSV: 91.5 CM/SEC
BH CV XLRA MEAS LEFT PROX ECA EDV: -14.1 CM/SEC
BH CV XLRA MEAS LEFT PROX ECA PSV: -87.9 CM/SEC
BH CV XLRA MEAS LEFT PROX ICA EDV: -26.3 CM/SEC
BH CV XLRA MEAS LEFT PROX ICA PSV: -67.6 CM/SEC
BH CV XLRA MEAS LEFT PROX SCLA PSV: 112 CM/SEC
BH CV XLRA MEAS LEFT VERTEBRAL A EDV: -13.6 CM/SEC
BH CV XLRA MEAS LEFT VERTEBRAL A PSV: -40.2 CM/SEC
BH CV XLRA MEAS RIGHT DIST CCA EDV: -20 CM/SEC
BH CV XLRA MEAS RIGHT DIST CCA PSV: -80.9 CM/SEC
BH CV XLRA MEAS RIGHT DIST ICA EDV: -20.1 CM/SEC
BH CV XLRA MEAS RIGHT DIST ICA PSV: -68.6 CM/SEC
BH CV XLRA MEAS RIGHT ICA/CCA RATIO: -1.87
BH CV XLRA MEAS RIGHT MID ICA EDV: 49.3 CM/SEC
BH CV XLRA MEAS RIGHT MID ICA PSV: 151 CM/SEC
BH CV XLRA MEAS RIGHT PROX CCA EDV: -17 CM/SEC
BH CV XLRA MEAS RIGHT PROX CCA PSV: -85.6 CM/SEC
BH CV XLRA MEAS RIGHT PROX ECA EDV: -13.8 CM/SEC
BH CV XLRA MEAS RIGHT PROX ECA PSV: -68 CM/SEC
BH CV XLRA MEAS RIGHT PROX ICA EDV: -17.7 CM/SEC
BH CV XLRA MEAS RIGHT PROX ICA PSV: -68.4 CM/SEC
BH CV XLRA MEAS RIGHT PROX SCLA PSV: 76.2 CM/SEC
BH CV XLRA MEAS RIGHT VERTEBRAL A EDV: -16.9 CM/SEC
BH CV XLRA MEAS RIGHT VERTEBRAL A PSV: -45.9 CM/SEC
BILIRUB SERPL-MCNC: 0.5 MG/DL (ref 0–1.2)
BILIRUB UR QL STRIP: NEGATIVE
BLD GP AB SCN SERPL QL: NEGATIVE
BUN SERPL-MCNC: 16 MG/DL (ref 8–23)
BUN/CREAT SERPL: 15.1 (ref 7–25)
CALCIUM SPEC-SCNC: 9.2 MG/DL (ref 8.6–10.5)
CHLORIDE SERPL-SCNC: 101 MMOL/L (ref 98–107)
CHOLEST SERPL-MCNC: 219 MG/DL (ref 0–200)
CLARITY UR: CLEAR
CLOSE TME COLL+ADP + EPINEP PNL BLD: 99 % (ref 86–100)
CO2 BLDA-SCNC: 26.1 MMOL/L (ref 23–27)
CO2 SERPL-SCNC: 25.3 MMOL/L (ref 22–29)
COLOR UR: YELLOW
CREAT SERPL-MCNC: 1.06 MG/DL (ref 0.76–1.27)
DEPRECATED RDW RBC AUTO: 42.9 FL (ref 37–54)
EGFRCR SERPLBLD CKD-EPI 2021: 75.5 ML/MIN/1.73
EOSINOPHIL # BLD AUTO: 0.27 10*3/MM3 (ref 0–0.4)
EOSINOPHIL NFR BLD AUTO: 6.4 % (ref 0.3–6.2)
ERYTHROCYTE [DISTWIDTH] IN BLOOD BY AUTOMATED COUNT: 13.4 % (ref 12.3–15.4)
GLOBULIN UR ELPH-MCNC: 3 GM/DL
GLUCOSE SERPL-MCNC: 111 MG/DL (ref 65–99)
GLUCOSE UR STRIP-MCNC: NEGATIVE MG/DL
HBA1C MFR BLD: 5.4 % (ref 4.8–5.6)
HCO3 BLDA-SCNC: 24.9 MMOL/L (ref 22–28)
HCT VFR BLD AUTO: 43.7 % (ref 37.5–51)
HDLC SERPL-MCNC: 46 MG/DL (ref 40–60)
HEMODILUTION: NO
HGB BLD-MCNC: 14.7 G/DL (ref 13–17.7)
HGB UR QL STRIP.AUTO: NEGATIVE
HOLD SPECIMEN: NORMAL
HOLD SPECIMEN: NORMAL
IMM GRANULOCYTES # BLD AUTO: 0 10*3/MM3 (ref 0–0.05)
IMM GRANULOCYTES NFR BLD AUTO: 0 % (ref 0–0.5)
INR PPP: 1.02 (ref 0.9–1.1)
KETONES UR QL STRIP: NEGATIVE
LDLC SERPL CALC-MCNC: 152 MG/DL (ref 0–100)
LDLC/HDLC SERPL: 3.25 {RATIO}
LEFT ARM BP: NORMAL MMHG
LEUKOCYTE ESTERASE UR QL STRIP.AUTO: NEGATIVE
LYMPHOCYTES # BLD AUTO: 1.88 10*3/MM3 (ref 0.7–3.1)
LYMPHOCYTES NFR BLD AUTO: 44.3 % (ref 19.6–45.3)
MAGNESIUM SERPL-MCNC: 1.9 MG/DL (ref 1.6–2.4)
MCH RBC QN AUTO: 30.1 PG (ref 26.6–33)
MCHC RBC AUTO-ENTMCNC: 33.6 G/DL (ref 31.5–35.7)
MCV RBC AUTO: 89.5 FL (ref 79–97)
MODALITY: NORMAL
MONOCYTES # BLD AUTO: 0.68 10*3/MM3 (ref 0.1–0.9)
MONOCYTES NFR BLD AUTO: 16 % (ref 5–12)
NEUTROPHILS NFR BLD AUTO: 1.37 10*3/MM3 (ref 1.7–7)
NEUTROPHILS NFR BLD AUTO: 32.4 % (ref 42.7–76)
NITRITE UR QL STRIP: NEGATIVE
NRBC BLD AUTO-RTO: 0 /100 WBC (ref 0–0.2)
NT-PROBNP SERPL-MCNC: 80.6 PG/ML (ref 0–900)
PCO2 BLDA: 38.7 MM HG (ref 35–45)
PH BLDA: 7.42 PH UNITS (ref 7.35–7.45)
PH UR STRIP.AUTO: 6.5 [PH] (ref 5–8)
PLATELET # BLD AUTO: 197 10*3/MM3 (ref 140–450)
PMV BLD AUTO: 9.5 FL (ref 6–12)
PO2 BLDA: 90.4 MM HG (ref 80–100)
POTASSIUM SERPL-SCNC: 4.2 MMOL/L (ref 3.5–5.2)
PROT SERPL-MCNC: 7.2 G/DL (ref 6–8.5)
PROT UR QL STRIP: NEGATIVE
PROTHROMBIN TIME: 13.5 SECONDS (ref 11.7–14.2)
QT INTERVAL: 372 MS
QTC INTERVAL: 424 MS
RBC # BLD AUTO: 4.88 10*6/MM3 (ref 4.14–5.8)
RH BLD: POSITIVE
RIGHT ARM BP: NORMAL MMHG
SAO2 % BLDCOA: 97.1 % (ref 92–98.5)
SARS-COV-2 RNA RESP QL NAA+PROBE: NOT DETECTED
SODIUM SERPL-SCNC: 136 MMOL/L (ref 136–145)
SP GR UR STRIP: 1 (ref 1–1.03)
T&S EXPIRATION DATE: NORMAL
TOTAL RATE: 18 BREATHS/MINUTE
TRIGL SERPL-MCNC: 117 MG/DL (ref 0–150)
UROBILINOGEN UR QL STRIP: NORMAL
VLDLC SERPL-MCNC: 21 MG/DL (ref 5–40)
WBC NRBC COR # BLD AUTO: 4.24 10*3/MM3 (ref 3.4–10.8)

## 2024-02-09 PROCEDURE — 82803 BLOOD GASES ANY COMBINATION: CPT | Performed by: THORACIC SURGERY (CARDIOTHORACIC VASCULAR SURGERY)

## 2024-02-09 PROCEDURE — 93880 EXTRACRANIAL BILAT STUDY: CPT

## 2024-02-09 PROCEDURE — 71046 X-RAY EXAM CHEST 2 VIEWS: CPT

## 2024-02-09 PROCEDURE — 93005 ELECTROCARDIOGRAM TRACING: CPT

## 2024-02-09 PROCEDURE — 86901 BLOOD TYPING SEROLOGIC RH(D): CPT

## 2024-02-09 PROCEDURE — 63710000001 MUPIROCIN 2 % OINTMENT: Performed by: NURSE PRACTITIONER

## 2024-02-09 PROCEDURE — 93010 ELECTROCARDIOGRAM REPORT: CPT | Performed by: STUDENT IN AN ORGANIZED HEALTH CARE EDUCATION/TRAINING PROGRAM

## 2024-02-09 PROCEDURE — 81003 URINALYSIS AUTO W/O SCOPE: CPT

## 2024-02-09 PROCEDURE — 63710000001 CHLORHEXIDINE 0.12 % SOLUTION: Performed by: NURSE PRACTITIONER

## 2024-02-09 PROCEDURE — 86920 COMPATIBILITY TEST SPIN: CPT

## 2024-02-09 PROCEDURE — A9270 NON-COVERED ITEM OR SERVICE: HCPCS | Performed by: NURSE PRACTITIONER

## 2024-02-09 PROCEDURE — S0260 H&P FOR SURGERY: HCPCS | Performed by: NURSE PRACTITIONER

## 2024-02-09 PROCEDURE — 83735 ASSAY OF MAGNESIUM: CPT

## 2024-02-09 PROCEDURE — 85730 THROMBOPLASTIN TIME PARTIAL: CPT

## 2024-02-09 PROCEDURE — 80061 LIPID PANEL: CPT

## 2024-02-09 PROCEDURE — 83880 ASSAY OF NATRIURETIC PEPTIDE: CPT

## 2024-02-09 PROCEDURE — 85025 COMPLETE CBC W/AUTO DIFF WBC: CPT

## 2024-02-09 PROCEDURE — 86850 RBC ANTIBODY SCREEN: CPT

## 2024-02-09 PROCEDURE — 85610 PROTHROMBIN TIME: CPT

## 2024-02-09 PROCEDURE — 87635 SARS-COV-2 COVID-19 AMP PRB: CPT

## 2024-02-09 PROCEDURE — 86900 BLOOD TYPING SEROLOGIC ABO: CPT

## 2024-02-09 PROCEDURE — 36600 WITHDRAWAL OF ARTERIAL BLOOD: CPT | Performed by: THORACIC SURGERY (CARDIOTHORACIC VASCULAR SURGERY)

## 2024-02-09 PROCEDURE — 36415 COLL VENOUS BLD VENIPUNCTURE: CPT

## 2024-02-09 PROCEDURE — 83036 HEMOGLOBIN GLYCOSYLATED A1C: CPT

## 2024-02-09 PROCEDURE — 85576 BLOOD PLATELET AGGREGATION: CPT

## 2024-02-09 PROCEDURE — 80053 COMPREHEN METABOLIC PANEL: CPT

## 2024-02-09 RX ORDER — CHLORHEXIDINE GLUCONATE 500 MG/1
CLOTH TOPICAL EVERY 12 HOURS PRN
Status: ACTIVE | OUTPATIENT
Start: 2024-02-09

## 2024-02-09 RX ORDER — SILDENAFIL 25 MG/1
25 TABLET, FILM COATED ORAL AS NEEDED
COMMUNITY
Start: 2023-09-08 | End: 2024-02-17 | Stop reason: HOSPADM

## 2024-02-09 RX ORDER — CHLORHEXIDINE GLUCONATE ORAL RINSE 1.2 MG/ML
15 SOLUTION DENTAL EVERY 12 HOURS
Status: DISPENSED | OUTPATIENT
Start: 2024-02-09 | End: 2024-02-10

## 2024-02-09 NOTE — H&P
Name: Timo Aceves ADMIT: (Not on file)   : 1955  PCP: George, Alexander Bancroft, MD    MRN: [id number] LOS: 0 days   AGE/SEX: 68 y.o. male  ROOM: Room/bed info not found     Chief Complaint: Aortic stenosis    Subjective     History of Present Illness  Patient is a 68 y.o. male with a history of hyperlipidemia. He denies tobacco or illicit drug use. He reports drinking alcohol occasionally. He established care with a new PCP around 6 months ago and was noted to have a heart murmer. He was sent for an echocardiogram which revealed normal EF at 60%, mild aortic valve regurgitation and severe aortic stenosis. He is asymptomatic and denies shortness of breath, chest pain, dizziness, cough, or lower extremity edema. He was referred to cardiology, and subsequently underwent cardiac catheterization on 10/17/23 which revealed normal coronary arteries. He was referred to Dr. Vincent for surgical evaluation given his severe aortic valve stenosis. He was last seen in the office on 23, and at that time Dr. Vincent recommended aortic valve replacement with mini sternotomy. The patient presents today to [facility] for preoperative testing prior to his upcoming surgery. He stopped his fish oil on 2/3/24 as directed. He otherwise denies any changes to his medical record since last being seen.       Past Medical History:   Diagnosis Date    Arthritis     Coronary artery disease     AORTIC STENOSIS    Elevated cholesterol     Heart murmur     Hyperlipidemia      Past Surgical History:   Procedure Laterality Date    CARDIAC CATHETERIZATION N/A 2023    Procedure: Coronaries only;  Surgeon: MAVIS Hwang MD;  Location: [facility] INVASIVE LOCATION;  Service: Cardiology;  Laterality: N/A;    ORIF CLAVICLE FRACTURE Right      Family History   Problem Relation Age of Onset    No Known Problems Mother     No Known Problems Father     Aneurysm Paternal Uncle     Malig Hyperthermia Neg Hx      Social History     Tobacco  Use    Smoking status: Former     Types: Cigarettes, Pipe    Smokeless tobacco: Never    Tobacco comments:     Pipe only in college   Vaping Use    Vaping Use: Never used   Substance Use Topics    Alcohol use: Yes     Comment: 2 monthly    Drug use: Never     No medications prior to admission.     Allergies:  Patient has no known allergies.    Review of Systems   Constitutional:  Negative for activity change and fatigue.   HENT:  Negative for dental problem, hearing loss, tinnitus and voice change.    Eyes: Negative.    Respiratory:  Negative for chest tightness and shortness of breath.    Cardiovascular:  Negative for chest pain, palpitations and leg swelling.   Gastrointestinal:  Negative for diarrhea, nausea and vomiting.   Endocrine: Negative for polydipsia, polyphagia and polyuria.   Genitourinary:  Negative for difficulty urinating, frequency and urgency.   Musculoskeletal:  Negative for arthralgias, gait problem and myalgias.   Skin:  Negative for rash and wound.   Allergic/Immunologic: Negative.    Neurological:  Negative for dizziness, seizures, syncope and light-headedness.   Hematological: Negative.    Psychiatric/Behavioral: Negative.          Objective    Vital Signs  Temp:  [96.9 °F (36.1 °C)] 96.9 °F (36.1 °C)  Heart Rate:  [101] 101  Resp:  [20] 20  BP: (133)/(85) 133/85  SpO2:  [95 %] 95 %  on   ;   Device (Oxygen Therapy): room air  There is no height or weight on file to calculate BMI.    Physical Exam  Constitutional:       Appearance: Normal appearance.   HENT:      Head: Normocephalic and atraumatic.      Nose: Nose normal. No congestion or rhinorrhea.      Mouth/Throat:      Mouth: Mucous membranes are moist.      Pharynx: Oropharynx is clear.   Eyes:      Pupils: Pupils are equal, round, and reactive to light.   Cardiovascular:      Rate and Rhythm: Normal rate and regular rhythm.      Pulses: Normal pulses.      Heart sounds: Murmur heard.   Pulmonary:      Effort: Pulmonary effort is  normal. No respiratory distress.      Breath sounds: Normal breath sounds. No stridor. No wheezing, rhonchi or rales.   Chest:      Chest wall: No tenderness.   Abdominal:      General: Bowel sounds are normal.      Palpations: Abdomen is soft.   Musculoskeletal:      Cervical back: Normal range of motion and neck supple.      Right lower leg: No edema.      Left lower leg: No edema.   Skin:     General: Skin is warm and dry.      Capillary Refill: Capillary refill takes less than 2 seconds.      Coloration: Skin is not jaundiced or pale.      Findings: No bruising, erythema, lesion or rash.   Neurological:      General: No focal deficit present.      Mental Status: He is oriented to person, place, and time. Mental status is at baseline.      Motor: No weakness.      Gait: Gait normal.   Psychiatric:         Mood and Affect: Mood normal.         Behavior: Behavior normal.         Thought Content: Thought content normal.         Judgment: Judgment normal.         Results Review:  I reviewed the patient's new clinical results. Lab work reviewed. UA negative. COVID-19 screening negative. EKG showed sinus rhythm with rates in the 70s. Carotid duplex showed normal coronaries bilaterally. CXR without evidences of acute processes.     Assessment & Plan  - severe aortic stenosis  - hyperlipidemia    Plan for AVR with mini sternotomy on Tuesday afternoon with Dr. Vincent  Reiterated to patient not to take any home medications the morning of surgery.   Questions answered with verbalized understanding      I discussed the patients findings and my recommendations with patient and family.      Saundra Mcgrath, ERIN  02/09/24  11:31 EST

## 2024-02-09 NOTE — DISCHARGE INSTRUCTIONS
Take the following medications the morning of surgery with a small sip of water:    none    If you are on prescription narcotic pain medication to control your pain you may also take that medication the morning of surgery.    General Instructions:  Do not eat or drink anything after midnight the night before surgery.  Infants may have breast milk up to four hours before surgery.  Infants drinking formula may drink formula up to six hours before surgery.   Patients who avoid smoking, chewing tobacco and alcohol for 4 weeks prior to surgery have a reduced risk of post-operative complications.  Quit smoking as many days before surgery as you can.  Do not smoke, use chewing tobacco or drink alcohol the day of surgery.   If applicable bring your C-PAP/ BI-PAP machine in with you to preop day of surgery.  Bring any papers given to you in the doctor’s office.  Wear clean comfortable clothes.  Do not wear contact lenses, false eyelashes or make-up.  Bring a case for your glasses.   Bring crutches or walker if applicable.  Remove all piercings.  Leave jewelry and any other valuables at home.  Hair extensions with metal clips must be removed prior to surgery.  The Pre-Admission Testing nurse will instruct you to bring medications if unable to obtain an accurate list in Pre-Admission Testing.        If you were given a blood bank ID arm band remember to bring it with you the day of surgery.    Preventing a Surgical Site Infection:  For 2 to 3 days before surgery, avoid shaving with a razor because the razor can irritate skin and make it easier to develop an infection.    Any areas of open skin can increase the risk of a post-operative wound infection by allowing bacteria to enter and travel throughout the body.  Notify your surgeon if you have any skin wounds / rashes even if it is not near the expected surgical site.  The area will need assessed to determine if surgery should be delayed until it is healed.  The night prior to  surgery shower using a fresh bar of anti-bacterial soap (such as Dial) and clean washcloth.  Sleep in a clean bed with clean clothing.  Do not allow pets to sleep with you.  Shower on the morning of surgery using a fresh bar of anti-bacterial soap (such as Dial) and clean washcloth.  Dry with a clean towel and dress in clean clothing.  Ask your surgeon if you will be receiving antibiotics prior to surgery.  Make sure you, your family, and all healthcare providers clean their hands with soap and water or an alcohol based hand  before caring for you or your wound.    Day of surgery:2/13/2024   0600  Your arrival time is approximately two hours before your scheduled surgery time.  Upon arrival, a Pre-op nurse and Anesthesiologist will review your health history, obtain vital signs, and answer questions you may have.  The only belongings needed at this time will be your home medications and if applicable your C-PAP/BI-PAP machine.  A Pre-op nurse will start an IV and you may receive medication in preparation for surgery, including something to help you relax.      Please be aware that surgery does come with discomfort.  We want to make every effort to control your discomfort so please discuss any uncontrolled symptoms with your nurse.   Your doctor will most likely have prescribed pain medications.      If you are going home after surgery you will receive individualized written care instructions before being discharged.  A responsible adult must drive you to and from the hospital on the day of your surgery and ideally stay with you through the night.  Discharge prescriptions can be filled by the hospital pharmacy during regular pharmacy hours.  If you are having surgery late in the day/evening your prescription may be e-prescribed to your pharmacy.  Please verify your pharmacy hours or chose a 24 hour pharmacy to avoid not having access to your prescription because your pharmacy has closed for the day.    If  you are staying overnight following surgery, you will be transported to your hospital room following the recovery period.  [facility] has all private rooms.    If you have any questions please call Pre-Admission Testing at [phone].  Deductibles and co-payments are collected on the day of service. Please be prepared to pay the required co-pay, deductible or deposit on the day of service as defined by your plan.    Call your surgeon immediately if you experience any of the following symptoms:  Sore Throat  Shortness of Breath or difficulty breathing  Cough  Chills  Body soreness or muscle pain  Headache  Fever  New loss of taste or smell  Do not arrive for your surgery ill.  Your procedure will need to be rescheduled to another time.  You will need to call your physician before the day of surgery to avoid any unnecessary exposure to hospital staff as well as other patients.         CHLORHEXIDINE CLOTH INSTRUCTIONS  The morning of surgery follow these instructions using the Chlorhexidine cloths you've been given.  These steps reduce bacteria on the body.  Do not use the cloths near your eyes, ears mouth, genitalia or on open wounds.  Throw the cloths away after use but do not try to flush them down a toilet.      Open and remove one cloth at a time from the package.    Leave the cloth unfolded and begin the bathing.  Massage the skin with the cloths using gentle pressure to remove bacteria.  Do not scrub harshly.   Follow the steps below with one 2% CHG cloth per area (6 total cloths).  One cloth for neck, shoulders and chest.  One cloth for both arms, hands, fingers and underarms (do underarms last).  One cloth for the abdomen followed by groin.  One cloth for right leg and foot including between the toes.  One cloth for left leg and foot including between the toes.  The last cloth is to be used for the back of the neck, back and buttocks.    Allow the CHG to air dry 3 minutes on the skin which  will give it time to work and decrease the chance of irritation.  The skin may feel sticky until it is dry.  Do not rinse with water or any other liquid or you will lose the beneficial effects of the CHG.  If mild skin irritation occurs, do rinse the skin to remove the CHG.  Report this to the nurse at time of admission.  Do not apply lotions, creams, ointments, deodorants or perfumes after using the clothes. Dress in clean clothes before coming to the hospital.    BACTROBAN NASAL OINTMENT  There are many germs normally in your nose. Bactroban is an ointment that will help reduce these germs. Please follow these instructions for Bactroban use:          _1___The day before surgery in the evening              Date_2/12/2024_______    __2__The day of surgery in the morning    Date__2/13/2024______    **Squirt ½ package of Bactroban Ointment onto a cotton applicator and apply to inside of 1st nostril.  Squirt the remaining Bactroban and apply to the inside of the other nostril.    PERIDEX- ORAL:  Use only if your surgeon has ordered  Use the night before and morning of surgery - Swish, gargle, and spit - do not swallow.

## 2024-02-13 ENCOUNTER — ANESTHESIA EVENT (OUTPATIENT)
Dept: PERIOP | Facility: HOSPITAL | Age: 69
End: 2024-02-13
Payer: MEDICARE

## 2024-02-13 ENCOUNTER — APPOINTMENT (OUTPATIENT)
Dept: GENERAL RADIOLOGY | Facility: HOSPITAL | Age: 69
End: 2024-02-13
Payer: MEDICARE

## 2024-02-13 ENCOUNTER — HOSPITAL ENCOUNTER (INPATIENT)
Facility: HOSPITAL | Age: 69
LOS: 4 days | Discharge: HOME HEALTH | End: 2024-02-17
Attending: THORACIC SURGERY (CARDIOTHORACIC VASCULAR SURGERY) | Admitting: THORACIC SURGERY (CARDIOTHORACIC VASCULAR SURGERY)
Payer: MEDICARE

## 2024-02-13 ENCOUNTER — ANESTHESIA (OUTPATIENT)
Dept: PERIOP | Facility: HOSPITAL | Age: 69
End: 2024-02-13
Payer: MEDICARE

## 2024-02-13 ENCOUNTER — ANCILLARY PROCEDURE (OUTPATIENT)
Dept: PERIOP | Facility: HOSPITAL | Age: 69
End: 2024-02-13
Payer: MEDICARE

## 2024-02-13 DIAGNOSIS — Z95.2 S/P AVR (AORTIC VALVE REPLACEMENT): Primary | ICD-10-CM

## 2024-02-13 DIAGNOSIS — I35.0 AORTIC STENOSIS, SEVERE: ICD-10-CM

## 2024-02-13 LAB
ACT BLD: 125 SECONDS (ref 82–152)
ACT BLD: 131 SECONDS (ref 82–152)
ACT BLD: 455 SECONDS (ref 82–152)
ACT BLD: 470 SECONDS (ref 82–152)
ACT BLD: 482 SECONDS (ref 82–152)
ALBUMIN SERPL-MCNC: 3.7 G/DL (ref 3.5–5.2)
ALBUMIN SERPL-MCNC: 4.3 G/DL (ref 3.5–5.2)
ANION GAP SERPL CALCULATED.3IONS-SCNC: 12.2 MMOL/L (ref 5–15)
ANION GAP SERPL CALCULATED.3IONS-SCNC: 9.9 MMOL/L (ref 5–15)
APTT PPP: 36.3 SECONDS (ref 22.7–35.4)
APTT PPP: 36.7 SECONDS (ref 22.7–35.4)
ARTERIAL PATENCY WRIST A: ABNORMAL
ARTERIAL PATENCY WRIST A: ABNORMAL
ATMOSPHERIC PRESS: 748.7 MMHG
ATMOSPHERIC PRESS: 750.3 MMHG
BASE EXCESS BLDA CALC-SCNC: -1 MMOL/L (ref -5–5)
BASE EXCESS BLDA CALC-SCNC: -1.5 MMOL/L (ref 0–2)
BASE EXCESS BLDA CALC-SCNC: -3 MMOL/L (ref -5–5)
BASE EXCESS BLDA CALC-SCNC: -3.6 MMOL/L (ref 0–2)
BASE EXCESS BLDA CALC-SCNC: 0 MMOL/L (ref -5–5)
BASE EXCESS BLDA CALC-SCNC: 1 MMOL/L (ref -5–5)
BASE EXCESS BLDA CALC-SCNC: 1 MMOL/L (ref -5–5)
BASOPHILS # BLD AUTO: 0.05 10*3/MM3 (ref 0–0.2)
BASOPHILS NFR BLD AUTO: 0.5 % (ref 0–1.5)
BDY SITE: ABNORMAL
BDY SITE: ABNORMAL
BUN SERPL-MCNC: 13 MG/DL (ref 8–23)
BUN SERPL-MCNC: 14 MG/DL (ref 8–23)
BUN/CREAT SERPL: 12.4 (ref 7–25)
BUN/CREAT SERPL: 13.1 (ref 7–25)
CA-I BLD-MCNC: 4.8 MG/DL (ref 4.6–5.4)
CA-I BLDA-SCNC: ABNORMAL MMOL/L
CA-I SERPL ISE-MCNC: 1.2 MMOL/L (ref 1.15–1.35)
CALCIUM SPEC-SCNC: 8.2 MG/DL (ref 8.6–10.5)
CALCIUM SPEC-SCNC: 8.6 MG/DL (ref 8.6–10.5)
CHLORIDE SERPL-SCNC: 105 MMOL/L (ref 98–107)
CHLORIDE SERPL-SCNC: 108 MMOL/L (ref 98–107)
CO2 BLDA-SCNC: 20.2 MMOL/L (ref 23–27)
CO2 BLDA-SCNC: 23 MMOL/L (ref 24–29)
CO2 BLDA-SCNC: 23.3 MMOL/L (ref 23–27)
CO2 BLDA-SCNC: 25 MMOL/L (ref 24–29)
CO2 BLDA-SCNC: 28 MMOL/L (ref 24–29)
CO2 BLDA-SCNC: 28 MMOL/L (ref 24–29)
CO2 BLDA-SCNC: 29 MMOL/L (ref 24–29)
CO2 SERPL-SCNC: 19.8 MMOL/L (ref 22–29)
CO2 SERPL-SCNC: 22.1 MMOL/L (ref 22–29)
CREAT SERPL-MCNC: 1.05 MG/DL (ref 0.76–1.27)
CREAT SERPL-MCNC: 1.07 MG/DL (ref 0.76–1.27)
DEPRECATED RDW RBC AUTO: 42.8 FL (ref 37–54)
DEPRECATED RDW RBC AUTO: 43.8 FL (ref 37–54)
DEPRECATED RDW RBC AUTO: 44.3 FL (ref 37–54)
DEVICE COMMENT: ABNORMAL
EGFRCR SERPLBLD CKD-EPI 2021: 74.7 ML/MIN/1.73
EGFRCR SERPLBLD CKD-EPI 2021: 76.4 ML/MIN/1.73
EOSINOPHIL # BLD AUTO: 0.15 10*3/MM3 (ref 0–0.4)
EOSINOPHIL NFR BLD AUTO: 1.4 % (ref 0.3–6.2)
ERYTHROCYTE [DISTWIDTH] IN BLOOD BY AUTOMATED COUNT: 13.5 % (ref 12.3–15.4)
ERYTHROCYTE [DISTWIDTH] IN BLOOD BY AUTOMATED COUNT: 13.6 % (ref 12.3–15.4)
ERYTHROCYTE [DISTWIDTH] IN BLOOD BY AUTOMATED COUNT: 13.6 % (ref 12.3–15.4)
FIBRINOGEN PPP-MCNC: 228 MG/DL (ref 219–464)
FIBRINOGEN PPP-MCNC: 251 MG/DL (ref 219–464)
GLUCOSE BLDC GLUCOMTR-MCNC: 111 MG/DL (ref 70–130)
GLUCOSE BLDC GLUCOMTR-MCNC: 118 MG/DL (ref 70–130)
GLUCOSE BLDC GLUCOMTR-MCNC: 130 MG/DL (ref 70–130)
GLUCOSE BLDC GLUCOMTR-MCNC: 132 MG/DL (ref 70–130)
GLUCOSE BLDC GLUCOMTR-MCNC: 133 MG/DL (ref 70–130)
GLUCOSE BLDC GLUCOMTR-MCNC: 133 MG/DL (ref 70–130)
GLUCOSE BLDC GLUCOMTR-MCNC: 145 MG/DL (ref 70–130)
GLUCOSE BLDC GLUCOMTR-MCNC: 149 MG/DL (ref 70–130)
GLUCOSE SERPL-MCNC: 142 MG/DL (ref 65–99)
GLUCOSE SERPL-MCNC: 142 MG/DL (ref 65–99)
HCO3 BLDA-SCNC: 19.3 MMOL/L (ref 22–28)
HCO3 BLDA-SCNC: 21.8 MMOL/L (ref 22–26)
HCO3 BLDA-SCNC: 22.3 MMOL/L (ref 22–28)
HCO3 BLDA-SCNC: 23.9 MMOL/L (ref 22–26)
HCO3 BLDA-SCNC: 26.1 MMOL/L (ref 22–26)
HCO3 BLDA-SCNC: 26.7 MMOL/L (ref 22–26)
HCO3 BLDA-SCNC: 27.1 MMOL/L (ref 22–26)
HCT VFR BLD AUTO: 34.4 % (ref 37.5–51)
HCT VFR BLD AUTO: 37.4 % (ref 37.5–51)
HCT VFR BLD AUTO: 39.2 % (ref 37.5–51)
HCT VFR BLDA CALC: 32 % (ref 38–51)
HCT VFR BLDA CALC: 33 % (ref 38–51)
HCT VFR BLDA CALC: 33 % (ref 38–51)
HCT VFR BLDA CALC: 35 % (ref 38–51)
HCT VFR BLDA CALC: 39 % (ref 38–51)
HEMODILUTION: NO
HEMODILUTION: NO
HGB BLD-MCNC: 11.7 G/DL (ref 13–17.7)
HGB BLD-MCNC: 13 G/DL (ref 13–17.7)
HGB BLD-MCNC: 13.3 G/DL (ref 13–17.7)
HGB BLDA-MCNC: 10.9 G/DL (ref 12–17)
HGB BLDA-MCNC: 11.2 G/DL (ref 12–17)
HGB BLDA-MCNC: 11.2 G/DL (ref 12–17)
HGB BLDA-MCNC: 11.9 G/DL (ref 12–17)
HGB BLDA-MCNC: 13.3 G/DL (ref 12–17)
INHALED O2 CONCENTRATION: 100 %
INHALED O2 CONCENTRATION: 40 %
INR PPP: 1.39 (ref 0.9–1.1)
INR PPP: 1.4 (ref 0.8–1.2)
INR PPP: 1.56 (ref 0.9–1.1)
LYMPHOCYTES # BLD AUTO: 1.16 10*3/MM3 (ref 0.7–3.1)
LYMPHOCYTES NFR BLD AUTO: 10.9 % (ref 19.6–45.3)
MAGNESIUM SERPL-MCNC: 2.2 MG/DL (ref 1.6–2.4)
MAGNESIUM SERPL-MCNC: 2.9 MG/DL (ref 1.6–2.4)
MCH RBC QN AUTO: 30.4 PG (ref 26.6–33)
MCH RBC QN AUTO: 30.5 PG (ref 26.6–33)
MCH RBC QN AUTO: 30.8 PG (ref 26.6–33)
MCHC RBC AUTO-ENTMCNC: 33.9 G/DL (ref 31.5–35.7)
MCHC RBC AUTO-ENTMCNC: 34 G/DL (ref 31.5–35.7)
MCHC RBC AUTO-ENTMCNC: 34.8 G/DL (ref 31.5–35.7)
MCV RBC AUTO: 88.6 FL (ref 79–97)
MCV RBC AUTO: 89.7 FL (ref 79–97)
MCV RBC AUTO: 89.8 FL (ref 79–97)
MEAN AIRWAY PRESSURE: 12
MODALITY: ABNORMAL
MODALITY: ABNORMAL
MONOCYTES # BLD AUTO: 0.71 10*3/MM3 (ref 0.1–0.9)
MONOCYTES NFR BLD AUTO: 6.7 % (ref 5–12)
NEUTROPHILS NFR BLD AUTO: 8.5 10*3/MM3 (ref 1.7–7)
NEUTROPHILS NFR BLD AUTO: 80 % (ref 42.7–76)
O2 A-A PPRESDIFF RESPIRATORY: 0.5 MMHG
O2 A-A PPRESDIFF RESPIRATORY: 0.7 MMHG
PAW @ PEAK INSP FLOW SETTING VENT: 25 CMH2O
PCO2 BLDA: 28.5 MM HG (ref 35–45)
PCO2 BLDA: 34.1 MM HG (ref 35–45)
PCO2 BLDA: 35.1 MM HG (ref 35–45)
PCO2 BLDA: 39.9 MM HG (ref 35–45)
PCO2 BLDA: 50.5 MM HG (ref 35–45)
PCO2 BLDA: 51.2 MM HG (ref 35–45)
PCO2 BLDA: 54.4 MM HG (ref 35–45)
PEEP RESPIRATORY: 8 CM[H2O]
PEEP RESPIRATORY: 8 CM[H2O]
PH BLDA: 7.33 PH UNITS (ref 7.35–7.6)
PH BLDA: 7.33 PH UNITS (ref 7.35–7.6)
PH BLDA: 7.35 PH UNITS (ref 7.35–7.6)
PH BLDA: 7.39 PH UNITS (ref 7.35–7.6)
PH BLDA: 7.41 PH UNITS (ref 7.35–7.6)
PH BLDA: 7.42 PH UNITS (ref 7.35–7.45)
PH BLDA: 7.44 PH UNITS (ref 7.35–7.45)
PHOSPHATE SERPL-MCNC: 2.8 MG/DL (ref 2.5–4.5)
PHOSPHATE SERPL-MCNC: 3 MG/DL (ref 2.5–4.5)
PLATELET # BLD AUTO: 121 10*3/MM3 (ref 140–450)
PLATELET # BLD AUTO: 121 10*3/MM3 (ref 140–450)
PLATELET # BLD AUTO: 144 10*3/MM3 (ref 140–450)
PMV BLD AUTO: 10.1 FL (ref 6–12)
PMV BLD AUTO: 10.2 FL (ref 6–12)
PMV BLD AUTO: 9.9 FL (ref 6–12)
PO2 BLDA: 174.2 MM HG (ref 80–100)
PO2 BLDA: 280 MMHG (ref 80–105)
PO2 BLDA: 345.7 MM HG (ref 80–100)
PO2 BLDA: 403 MMHG (ref 80–105)
PO2 BLDA: 49 MMHG (ref 80–105)
PO2 BLDA: 496 MMHG (ref 80–105)
PO2 BLDA: 563 MMHG (ref 80–105)
POTASSIUM BLDA-SCNC: 3.6 MMOL/L (ref 3.5–4.9)
POTASSIUM BLDA-SCNC: 4.6 MMOL/L (ref 3.5–4.9)
POTASSIUM BLDA-SCNC: 4.6 MMOL/L (ref 3.5–4.9)
POTASSIUM BLDA-SCNC: 4.7 MMOL/L (ref 3.5–4.9)
POTASSIUM BLDA-SCNC: 4.8 MMOL/L (ref 3.5–4.9)
POTASSIUM SERPL-SCNC: 4 MMOL/L (ref 3.5–5.2)
POTASSIUM SERPL-SCNC: 4.6 MMOL/L (ref 3.5–5.2)
PROTHROMBIN TIME: 16.2 SECONDS (ref 12.8–15.2)
PROTHROMBIN TIME: 17.3 SECONDS (ref 11.7–14.2)
PROTHROMBIN TIME: 18.9 SECONDS (ref 11.7–14.2)
PSV: 8 CMH2O
QT INTERVAL: 423 MS
QTC INTERVAL: 463 MS
RBC # BLD AUTO: 3.83 10*6/MM3 (ref 4.14–5.8)
RBC # BLD AUTO: 4.22 10*6/MM3 (ref 4.14–5.8)
RBC # BLD AUTO: 4.37 10*6/MM3 (ref 4.14–5.8)
SAO2 % BLDA: 100 % (ref 95–98)
SAO2 % BLDA: 79 % (ref 95–98)
SAO2 % BLDCOA: 99.6 % (ref 92–98.5)
SAO2 % BLDCOA: 99.9 % (ref 92–98.5)
SET MECH RESP RATE: 14
SET MECH RESP RATE: 17
SODIUM SERPL-SCNC: 137 MMOL/L (ref 136–145)
SODIUM SERPL-SCNC: 140 MMOL/L (ref 136–145)
TOTAL RATE: 14 BREATHS/MINUTE
TOTAL RATE: 17 BREATHS/MINUTE
VENTILATOR MODE: ABNORMAL
VENTILATOR MODE: ABNORMAL
VT ON VENT VENT: 750 ML
VT ON VENT VENT: 920 ML
WBC NRBC COR # BLD AUTO: 10.62 10*3/MM3 (ref 3.4–10.8)
WBC NRBC COR # BLD AUTO: 8.54 10*3/MM3 (ref 3.4–10.8)
WBC NRBC COR # BLD AUTO: 9.16 10*3/MM3 (ref 3.4–10.8)

## 2024-02-13 PROCEDURE — 94761 N-INVAS EAR/PLS OXIMETRY MLT: CPT

## 2024-02-13 PROCEDURE — 25010000002 FENTANYL CITRATE (PF) 50 MCG/ML SOLUTION: Performed by: ANESTHESIOLOGY

## 2024-02-13 PROCEDURE — 25010000002 PROPOFOL 10 MG/ML EMULSION: Performed by: ANESTHESIOLOGY

## 2024-02-13 PROCEDURE — 85384 FIBRINOGEN ACTIVITY: CPT | Performed by: NURSE PRACTITIONER

## 2024-02-13 PROCEDURE — 25010000002 FENTANYL CITRATE (PF) 250 MCG/5ML SOLUTION: Performed by: ANESTHESIOLOGY

## 2024-02-13 PROCEDURE — 33405 REPLACEMENT AORTIC VALVE OPN: CPT | Performed by: THORACIC SURGERY (CARDIOTHORACIC VASCULAR SURGERY)

## 2024-02-13 PROCEDURE — 25810000003 SODIUM CHLORIDE 0.9 % SOLUTION

## 2024-02-13 PROCEDURE — 85610 PROTHROMBIN TIME: CPT

## 2024-02-13 PROCEDURE — C1889 IMPLANT/INSERT DEVICE, NOC: HCPCS | Performed by: THORACIC SURGERY (CARDIOTHORACIC VASCULAR SURGERY)

## 2024-02-13 PROCEDURE — 82803 BLOOD GASES ANY COMBINATION: CPT | Performed by: NURSE PRACTITIONER

## 2024-02-13 PROCEDURE — 25010000002 PROPOFOL 10 MG/ML EMULSION: Performed by: NURSE PRACTITIONER

## 2024-02-13 PROCEDURE — 25010000002 ACETAMINOPHEN 10 MG/ML SOLUTION: Performed by: NURSE PRACTITIONER

## 2024-02-13 PROCEDURE — 85014 HEMATOCRIT: CPT

## 2024-02-13 PROCEDURE — 25810000003 SODIUM CHLORIDE 0.9 % SOLUTION: Performed by: NURSE PRACTITIONER

## 2024-02-13 PROCEDURE — C1713 ANCHOR/SCREW BN/BN,TIS/BN: HCPCS | Performed by: THORACIC SURGERY (CARDIOTHORACIC VASCULAR SURGERY)

## 2024-02-13 PROCEDURE — 25010000002 PHENYLEPHRINE 10 MG/ML SOLUTION: Performed by: ANESTHESIOLOGY

## 2024-02-13 PROCEDURE — B24BZZ4 ULTRASONOGRAPHY OF HEART WITH AORTA, TRANSESOPHAGEAL: ICD-10-PCS | Performed by: ANESTHESIOLOGY

## 2024-02-13 PROCEDURE — 93010 ELECTROCARDIOGRAM REPORT: CPT | Performed by: INTERNAL MEDICINE

## 2024-02-13 PROCEDURE — 85027 COMPLETE CBC AUTOMATED: CPT | Performed by: THORACIC SURGERY (CARDIOTHORACIC VASCULAR SURGERY)

## 2024-02-13 PROCEDURE — 85384 FIBRINOGEN ACTIVITY: CPT | Performed by: THORACIC SURGERY (CARDIOTHORACIC VASCULAR SURGERY)

## 2024-02-13 PROCEDURE — 25010000002 ALBUMIN HUMAN 5% PER 50 ML: Performed by: NURSE PRACTITIONER

## 2024-02-13 PROCEDURE — 25010000002 METOCLOPRAMIDE PER 10 MG: Performed by: NURSE PRACTITIONER

## 2024-02-13 PROCEDURE — 25010000002 PROPOFOL 200 MG/20ML EMULSION: Performed by: ANESTHESIOLOGY

## 2024-02-13 PROCEDURE — 85610 PROTHROMBIN TIME: CPT | Performed by: NURSE PRACTITIONER

## 2024-02-13 PROCEDURE — 94799 UNLISTED PULMONARY SVC/PX: CPT

## 2024-02-13 PROCEDURE — 85610 PROTHROMBIN TIME: CPT | Performed by: THORACIC SURGERY (CARDIOTHORACIC VASCULAR SURGERY)

## 2024-02-13 PROCEDURE — 25010000002 HEPARIN (PORCINE) PER 1000 UNITS: Performed by: ANESTHESIOLOGY

## 2024-02-13 PROCEDURE — 5A1221Z PERFORMANCE OF CARDIAC OUTPUT, CONTINUOUS: ICD-10-PCS | Performed by: THORACIC SURGERY (CARDIOTHORACIC VASCULAR SURGERY)

## 2024-02-13 PROCEDURE — 25810000003 SODIUM CHLORIDE 0.9 % SOLUTION: Performed by: ANESTHESIOLOGY

## 2024-02-13 PROCEDURE — 82330 ASSAY OF CALCIUM: CPT | Performed by: NURSE PRACTITIONER

## 2024-02-13 PROCEDURE — 02RF08Z REPLACEMENT OF AORTIC VALVE WITH ZOOPLASTIC TISSUE, OPEN APPROACH: ICD-10-PCS | Performed by: THORACIC SURGERY (CARDIOTHORACIC VASCULAR SURGERY)

## 2024-02-13 PROCEDURE — 25010000002 PROTAMINE SULFATE PER 10 MG: Performed by: ANESTHESIOLOGY

## 2024-02-13 PROCEDURE — 25010000002 MIDAZOLAM PER 1 MG: Performed by: ANESTHESIOLOGY

## 2024-02-13 PROCEDURE — 83735 ASSAY OF MAGNESIUM: CPT | Performed by: NURSE PRACTITIONER

## 2024-02-13 PROCEDURE — 82948 REAGENT STRIP/BLOOD GLUCOSE: CPT

## 2024-02-13 PROCEDURE — 25010000002 MORPHINE PER 10 MG: Performed by: NURSE PRACTITIONER

## 2024-02-13 PROCEDURE — 86901 BLOOD TYPING SEROLOGIC RH(D): CPT

## 2024-02-13 PROCEDURE — 88311 DECALCIFY TISSUE: CPT | Performed by: THORACIC SURGERY (CARDIOTHORACIC VASCULAR SURGERY)

## 2024-02-13 PROCEDURE — 33405 REPLACEMENT AORTIC VALVE OPN: CPT | Performed by: PHYSICIAN ASSISTANT

## 2024-02-13 PROCEDURE — 85027 COMPLETE CBC AUTOMATED: CPT | Performed by: NURSE PRACTITIONER

## 2024-02-13 PROCEDURE — 85025 COMPLETE CBC W/AUTO DIFF WBC: CPT | Performed by: NURSE PRACTITIONER

## 2024-02-13 PROCEDURE — 94002 VENT MGMT INPAT INIT DAY: CPT

## 2024-02-13 PROCEDURE — 25010000002 VANCOMYCIN 1 G RECONSTITUTED SOLUTION

## 2024-02-13 PROCEDURE — 71045 X-RAY EXAM CHEST 1 VIEW: CPT

## 2024-02-13 PROCEDURE — 93318 ECHO TRANSESOPHAGEAL INTRAOP: CPT | Performed by: ANESTHESIOLOGY

## 2024-02-13 PROCEDURE — 25010000002 HEPARIN (PORCINE) PER 1000 UNITS

## 2024-02-13 PROCEDURE — 0 PROTAMINE SULFATE PER 10 MG: Performed by: THORACIC SURGERY (CARDIOTHORACIC VASCULAR SURGERY)

## 2024-02-13 PROCEDURE — 85730 THROMBOPLASTIN TIME PARTIAL: CPT | Performed by: NURSE PRACTITIONER

## 2024-02-13 PROCEDURE — 82803 BLOOD GASES ANY COMBINATION: CPT

## 2024-02-13 PROCEDURE — 80069 RENAL FUNCTION PANEL: CPT | Performed by: NURSE PRACTITIONER

## 2024-02-13 PROCEDURE — 85347 COAGULATION TIME ACTIVATED: CPT

## 2024-02-13 PROCEDURE — 93005 ELECTROCARDIOGRAM TRACING: CPT | Performed by: NURSE PRACTITIONER

## 2024-02-13 PROCEDURE — C1751 CATH, INF, PER/CENT/MIDLINE: HCPCS | Performed by: ANESTHESIOLOGY

## 2024-02-13 PROCEDURE — 85018 HEMOGLOBIN: CPT

## 2024-02-13 PROCEDURE — 85730 THROMBOPLASTIN TIME PARTIAL: CPT | Performed by: THORACIC SURGERY (CARDIOTHORACIC VASCULAR SURGERY)

## 2024-02-13 PROCEDURE — 25010000002 MAGNESIUM SULFATE PER 500 MG OF MAGNESIUM: Performed by: ANESTHESIOLOGY

## 2024-02-13 PROCEDURE — 82947 ASSAY GLUCOSE BLOOD QUANT: CPT

## 2024-02-13 PROCEDURE — 25010000002 PHENYLEPHRINE 10 MG/ML SOLUTION 5 ML VIAL: Performed by: ANESTHESIOLOGY

## 2024-02-13 PROCEDURE — 25010000002 CEFAZOLIN IN DEXTROSE 2-4 GM/100ML-% SOLUTION: Performed by: NURSE PRACTITIONER

## 2024-02-13 PROCEDURE — 25010000002 CEFAZOLIN IN DEXTROSE 2000 MG/ 100 ML SOLUTION: Performed by: NURSE PRACTITIONER

## 2024-02-13 PROCEDURE — 25010000002 NICARDIPINE 2.5 MG/ML SOLUTION 10 ML VIAL: Performed by: ANESTHESIOLOGY

## 2024-02-13 PROCEDURE — 88305 TISSUE EXAM BY PATHOLOGIST: CPT | Performed by: THORACIC SURGERY (CARDIOTHORACIC VASCULAR SURGERY)

## 2024-02-13 PROCEDURE — 86900 BLOOD TYPING SEROLOGIC ABO: CPT

## 2024-02-13 PROCEDURE — P9041 ALBUMIN (HUMAN),5%, 50ML: HCPCS | Performed by: NURSE PRACTITIONER

## 2024-02-13 PROCEDURE — 25810000003 SODIUM CHLORIDE 0.9 % SOLUTION 250 ML FLEX CONT: Performed by: ANESTHESIOLOGY

## 2024-02-13 DEVICE — WR STRN MYOWIRE2 SS DBL BE3 14IN SZ6 PK/3: Type: IMPLANTABLE DEVICE | Site: STERNUM | Status: FUNCTIONAL

## 2024-02-13 DEVICE — VLV PERICARD PERIMOUNT MAGNA EASE 27: Type: IMPLANTABLE DEVICE | Site: STERNUM | Status: FUNCTIONAL

## 2024-02-13 DEVICE — WR STRN MYOWIRE2 SS DBL CCS2 18IN SZ7: Type: IMPLANTABLE DEVICE | Site: STERNUM | Status: FUNCTIONAL

## 2024-02-13 RX ORDER — PANTOPRAZOLE SODIUM 40 MG/1
40 TABLET, DELAYED RELEASE ORAL EVERY MORNING
Status: DISCONTINUED | OUTPATIENT
Start: 2024-02-14 | End: 2024-02-17 | Stop reason: HOSPADM

## 2024-02-13 RX ORDER — ALPRAZOLAM 0.25 MG/1
0.25 TABLET ORAL EVERY 8 HOURS PRN
Status: DISCONTINUED | OUTPATIENT
Start: 2024-02-13 | End: 2024-02-17 | Stop reason: HOSPADM

## 2024-02-13 RX ORDER — DEXMEDETOMIDINE HYDROCHLORIDE 4 UG/ML
.2-1.5 INJECTION, SOLUTION INTRAVENOUS
Status: DISCONTINUED | OUTPATIENT
Start: 2024-02-13 | End: 2024-02-14

## 2024-02-13 RX ORDER — AMOXICILLIN 250 MG
2 CAPSULE ORAL NIGHTLY
Status: DISCONTINUED | OUTPATIENT
Start: 2024-02-14 | End: 2024-02-17 | Stop reason: HOSPADM

## 2024-02-13 RX ORDER — ACETAMINOPHEN 160 MG/5ML
650 SOLUTION ORAL EVERY 4 HOURS PRN
Status: DISCONTINUED | OUTPATIENT
Start: 2024-02-14 | End: 2024-02-17 | Stop reason: HOSPADM

## 2024-02-13 RX ORDER — NITROGLYCERIN 0.4 MG/1
0.4 TABLET SUBLINGUAL
Status: DISCONTINUED | OUTPATIENT
Start: 2024-02-13 | End: 2024-02-17 | Stop reason: HOSPADM

## 2024-02-13 RX ORDER — MAGNESIUM SULFATE HEPTAHYDRATE 40 MG/ML
2 INJECTION, SOLUTION INTRAVENOUS EVERY 8 HOURS
Status: DISPENSED | OUTPATIENT
Start: 2024-02-13 | End: 2024-02-14

## 2024-02-13 RX ORDER — AMINOCAPROIC ACID 250 MG/ML
INJECTION, SOLUTION INTRAVENOUS AS NEEDED
Status: DISCONTINUED | OUTPATIENT
Start: 2024-02-13 | End: 2024-02-13 | Stop reason: SURG

## 2024-02-13 RX ORDER — MIDAZOLAM HYDROCHLORIDE 1 MG/ML
2 INJECTION INTRAMUSCULAR; INTRAVENOUS
Status: DISCONTINUED | OUTPATIENT
Start: 2024-02-13 | End: 2024-02-14

## 2024-02-13 RX ORDER — ASPIRIN 81 MG/1
81 TABLET ORAL DAILY
Status: DISCONTINUED | OUTPATIENT
Start: 2024-02-14 | End: 2024-02-17 | Stop reason: HOSPADM

## 2024-02-13 RX ORDER — MIDAZOLAM HYDROCHLORIDE 1 MG/ML
INJECTION INTRAMUSCULAR; INTRAVENOUS AS NEEDED
Status: DISCONTINUED | OUTPATIENT
Start: 2024-02-13 | End: 2024-02-13 | Stop reason: SURG

## 2024-02-13 RX ORDER — LIDOCAINE HYDROCHLORIDE 10 MG/ML
0.5 INJECTION, SOLUTION INFILTRATION; PERINEURAL ONCE AS NEEDED
Status: DISCONTINUED | OUTPATIENT
Start: 2024-02-13 | End: 2024-02-13 | Stop reason: HOSPADM

## 2024-02-13 RX ORDER — SODIUM CHLORIDE 9 MG/ML
INJECTION, SOLUTION INTRAVENOUS CONTINUOUS PRN
Status: DISCONTINUED | OUTPATIENT
Start: 2024-02-13 | End: 2024-02-13 | Stop reason: SURG

## 2024-02-13 RX ORDER — POLYETHYLENE GLYCOL 3350 17 G/17G
17 POWDER, FOR SOLUTION ORAL DAILY PRN
Status: DISCONTINUED | OUTPATIENT
Start: 2024-02-13 | End: 2024-02-17 | Stop reason: HOSPADM

## 2024-02-13 RX ORDER — PROPOFOL 10 MG/ML
INJECTION, EMULSION INTRAVENOUS AS NEEDED
Status: DISCONTINUED | OUTPATIENT
Start: 2024-02-13 | End: 2024-02-13 | Stop reason: SURG

## 2024-02-13 RX ORDER — PHENYLEPHRINE HCL IN 0.9% NACL 0.5 MG/5ML
.2-2 SYRINGE (ML) INTRAVENOUS CONTINUOUS PRN
Status: DISCONTINUED | OUTPATIENT
Start: 2024-02-13 | End: 2024-02-14

## 2024-02-13 RX ORDER — ALBUMIN, HUMAN INJ 5% 5 %
1500 SOLUTION INTRAVENOUS AS NEEDED
Status: DISPENSED | OUTPATIENT
Start: 2024-02-13 | End: 2024-02-14

## 2024-02-13 RX ORDER — ENOXAPARIN SODIUM 100 MG/ML
40 INJECTION SUBCUTANEOUS DAILY
Status: DISCONTINUED | OUTPATIENT
Start: 2024-02-14 | End: 2024-02-17 | Stop reason: HOSPADM

## 2024-02-13 RX ORDER — SODIUM CHLORIDE 9 MG/ML
30 INJECTION, SOLUTION INTRAVENOUS CONTINUOUS
Status: DISCONTINUED | OUTPATIENT
Start: 2024-02-13 | End: 2024-02-17 | Stop reason: HOSPADM

## 2024-02-13 RX ORDER — PROTAMINE SULFATE 10 MG/ML
INJECTION, SOLUTION INTRAVENOUS AS NEEDED
Status: DISCONTINUED | OUTPATIENT
Start: 2024-02-13 | End: 2024-02-13 | Stop reason: HOSPADM

## 2024-02-13 RX ORDER — MIDAZOLAM HYDROCHLORIDE 1 MG/ML
0.5 INJECTION INTRAMUSCULAR; INTRAVENOUS
Status: COMPLETED | OUTPATIENT
Start: 2024-02-13 | End: 2024-02-13

## 2024-02-13 RX ORDER — MIDAZOLAM HYDROCHLORIDE 1 MG/ML
INJECTION INTRAMUSCULAR; INTRAVENOUS
Status: DISCONTINUED | OUTPATIENT
Start: 2024-02-13 | End: 2024-02-17 | Stop reason: HOSPADM

## 2024-02-13 RX ORDER — LIDOCAINE HYDROCHLORIDE 20 MG/ML
INJECTION, SOLUTION INFILTRATION; PERINEURAL AS NEEDED
Status: DISCONTINUED | OUTPATIENT
Start: 2024-02-13 | End: 2024-02-13 | Stop reason: SURG

## 2024-02-13 RX ORDER — FENTANYL CITRATE 50 UG/ML
25 INJECTION, SOLUTION INTRAMUSCULAR; INTRAVENOUS ONCE AS NEEDED
Status: COMPLETED | OUTPATIENT
Start: 2024-02-13 | End: 2024-02-13

## 2024-02-13 RX ORDER — DOPAMINE HYDROCHLORIDE 160 MG/100ML
2-20 INJECTION, SOLUTION INTRAVENOUS CONTINUOUS PRN
Status: DISCONTINUED | OUTPATIENT
Start: 2024-02-13 | End: 2024-02-14

## 2024-02-13 RX ORDER — MORPHINE SULFATE 2 MG/ML
4 INJECTION, SOLUTION INTRAMUSCULAR; INTRAVENOUS
Status: DISCONTINUED | OUTPATIENT
Start: 2024-02-13 | End: 2024-02-14

## 2024-02-13 RX ORDER — SODIUM CHLORIDE 0.9 % (FLUSH) 0.9 %
3-10 SYRINGE (ML) INJECTION AS NEEDED
Status: DISCONTINUED | OUTPATIENT
Start: 2024-02-13 | End: 2024-02-13 | Stop reason: HOSPADM

## 2024-02-13 RX ORDER — ACETAMINOPHEN 10 MG/ML
1000 INJECTION, SOLUTION INTRAVENOUS EVERY 8 HOURS
Status: COMPLETED | OUTPATIENT
Start: 2024-02-13 | End: 2024-02-14

## 2024-02-13 RX ORDER — FAMOTIDINE 10 MG/ML
20 INJECTION, SOLUTION INTRAVENOUS ONCE
Status: COMPLETED | OUTPATIENT
Start: 2024-02-13 | End: 2024-02-13

## 2024-02-13 RX ORDER — ONDANSETRON 2 MG/ML
4 INJECTION INTRAMUSCULAR; INTRAVENOUS EVERY 6 HOURS PRN
Status: DISCONTINUED | OUTPATIENT
Start: 2024-02-13 | End: 2024-02-17 | Stop reason: HOSPADM

## 2024-02-13 RX ORDER — MILRINONE LACTATE 0.2 MG/ML
.25-.375 INJECTION, SOLUTION INTRAVENOUS CONTINUOUS PRN
Status: DISCONTINUED | OUTPATIENT
Start: 2024-02-13 | End: 2024-02-14

## 2024-02-13 RX ORDER — FENTANYL CITRATE 50 UG/ML
INJECTION, SOLUTION INTRAMUSCULAR; INTRAVENOUS
Status: DISCONTINUED | OUTPATIENT
Start: 2024-02-13 | End: 2024-02-17 | Stop reason: HOSPADM

## 2024-02-13 RX ORDER — ACETAMINOPHEN 650 MG/1
650 SUPPOSITORY RECTAL EVERY 4 HOURS
Status: ACTIVE | OUTPATIENT
Start: 2024-02-13 | End: 2024-02-14

## 2024-02-13 RX ORDER — CEFAZOLIN SODIUM 2 G/100ML
2000 INJECTION, SOLUTION INTRAVENOUS EVERY 8 HOURS
Qty: 500 ML | Refills: 0 | Status: COMPLETED | OUTPATIENT
Start: 2024-02-13 | End: 2024-02-15

## 2024-02-13 RX ORDER — NOREPINEPHRINE BITARTRATE 0.03 MG/ML
.02-.2 INJECTION, SOLUTION INTRAVENOUS CONTINUOUS PRN
Status: DISCONTINUED | OUTPATIENT
Start: 2024-02-13 | End: 2024-02-17 | Stop reason: HOSPADM

## 2024-02-13 RX ORDER — NITROGLYCERIN 20 MG/100ML
5-200 INJECTION INTRAVENOUS
Status: DISCONTINUED | OUTPATIENT
Start: 2024-02-13 | End: 2024-02-14

## 2024-02-13 RX ORDER — ACETAMINOPHEN 325 MG/1
650 TABLET ORAL EVERY 4 HOURS
Status: ACTIVE | OUTPATIENT
Start: 2024-02-13 | End: 2024-02-14

## 2024-02-13 RX ORDER — ACETAMINOPHEN 160 MG/5ML
650 SOLUTION ORAL EVERY 4 HOURS
Status: ACTIVE | OUTPATIENT
Start: 2024-02-13 | End: 2024-02-14

## 2024-02-13 RX ORDER — CYCLOBENZAPRINE HCL 10 MG
10 TABLET ORAL EVERY 8 HOURS PRN
Status: DISCONTINUED | OUTPATIENT
Start: 2024-02-14 | End: 2024-02-17 | Stop reason: HOSPADM

## 2024-02-13 RX ORDER — PHENYLEPHRINE HYDROCHLORIDE 10 MG/ML
INJECTION INTRAVENOUS AS NEEDED
Status: DISCONTINUED | OUTPATIENT
Start: 2024-02-13 | End: 2024-02-13 | Stop reason: SURG

## 2024-02-13 RX ORDER — HEPARIN SODIUM 1000 [USP'U]/ML
INJECTION, SOLUTION INTRAVENOUS; SUBCUTANEOUS AS NEEDED
Status: DISCONTINUED | OUTPATIENT
Start: 2024-02-13 | End: 2024-02-13 | Stop reason: SURG

## 2024-02-13 RX ORDER — BISACODYL 5 MG/1
10 TABLET, DELAYED RELEASE ORAL DAILY PRN
Status: DISCONTINUED | OUTPATIENT
Start: 2024-02-13 | End: 2024-02-17 | Stop reason: HOSPADM

## 2024-02-13 RX ORDER — SODIUM CHLORIDE, SODIUM LACTATE, POTASSIUM CHLORIDE, CALCIUM CHLORIDE 600; 310; 30; 20 MG/100ML; MG/100ML; MG/100ML; MG/100ML
9 INJECTION, SOLUTION INTRAVENOUS CONTINUOUS
Status: DISCONTINUED | OUTPATIENT
Start: 2024-02-13 | End: 2024-02-13

## 2024-02-13 RX ORDER — MORPHINE SULFATE 2 MG/ML
1 INJECTION, SOLUTION INTRAMUSCULAR; INTRAVENOUS EVERY 4 HOURS PRN
Status: DISCONTINUED | OUTPATIENT
Start: 2024-02-13 | End: 2024-02-17 | Stop reason: HOSPADM

## 2024-02-13 RX ORDER — FENTANYL CITRATE 50 UG/ML
INJECTION, SOLUTION INTRAMUSCULAR; INTRAVENOUS AS NEEDED
Status: DISCONTINUED | OUTPATIENT
Start: 2024-02-13 | End: 2024-02-13 | Stop reason: SURG

## 2024-02-13 RX ORDER — OXYCODONE HYDROCHLORIDE 5 MG/1
10 TABLET ORAL EVERY 4 HOURS PRN
Status: DISCONTINUED | OUTPATIENT
Start: 2024-02-13 | End: 2024-02-17 | Stop reason: HOSPADM

## 2024-02-13 RX ORDER — MIDAZOLAM HYDROCHLORIDE 1 MG/ML
1 INJECTION INTRAMUSCULAR; INTRAVENOUS ONCE
Status: DISCONTINUED | OUTPATIENT
Start: 2024-02-13 | End: 2024-02-13

## 2024-02-13 RX ORDER — ROCURONIUM BROMIDE 10 MG/ML
INJECTION, SOLUTION INTRAVENOUS AS NEEDED
Status: DISCONTINUED | OUTPATIENT
Start: 2024-02-13 | End: 2024-02-13 | Stop reason: SURG

## 2024-02-13 RX ORDER — DEXTROSE MONOHYDRATE 25 G/50ML
10-50 INJECTION, SOLUTION INTRAVENOUS
Status: DISCONTINUED | OUTPATIENT
Start: 2024-02-13 | End: 2024-02-14

## 2024-02-13 RX ORDER — NICOTINE POLACRILEX 4 MG
15 LOZENGE BUCCAL
Status: DISCONTINUED | OUTPATIENT
Start: 2024-02-13 | End: 2024-02-14

## 2024-02-13 RX ORDER — MIDAZOLAM HYDROCHLORIDE 1 MG/ML
1 INJECTION INTRAMUSCULAR; INTRAVENOUS ONCE
Status: COMPLETED | OUTPATIENT
Start: 2024-02-13 | End: 2024-02-13

## 2024-02-13 RX ORDER — IBUPROFEN 600 MG/1
1 TABLET ORAL
Status: DISCONTINUED | OUTPATIENT
Start: 2024-02-13 | End: 2024-02-14

## 2024-02-13 RX ORDER — MEPERIDINE HYDROCHLORIDE 25 MG/ML
25 INJECTION INTRAMUSCULAR; INTRAVENOUS; SUBCUTANEOUS EVERY 4 HOURS PRN
Status: DISCONTINUED | OUTPATIENT
Start: 2024-02-13 | End: 2024-02-14

## 2024-02-13 RX ORDER — PROTAMINE SULFATE 10 MG/ML
INJECTION, SOLUTION INTRAVENOUS AS NEEDED
Status: DISCONTINUED | OUTPATIENT
Start: 2024-02-13 | End: 2024-02-13 | Stop reason: SURG

## 2024-02-13 RX ORDER — CHLORHEXIDINE GLUCONATE ORAL RINSE 1.2 MG/ML
15 SOLUTION DENTAL ONCE
Status: COMPLETED | OUTPATIENT
Start: 2024-02-13 | End: 2024-02-13

## 2024-02-13 RX ORDER — MAGNESIUM HYDROXIDE 1200 MG/15ML
LIQUID ORAL AS NEEDED
Status: DISCONTINUED | OUTPATIENT
Start: 2024-02-13 | End: 2024-02-13 | Stop reason: HOSPADM

## 2024-02-13 RX ORDER — METOCLOPRAMIDE HYDROCHLORIDE 5 MG/ML
10 INJECTION INTRAMUSCULAR; INTRAVENOUS EVERY 6 HOURS
Status: DISPENSED | OUTPATIENT
Start: 2024-02-13 | End: 2024-02-14

## 2024-02-13 RX ORDER — NALOXONE HCL 0.4 MG/ML
0.4 VIAL (ML) INJECTION
Status: DISCONTINUED | OUTPATIENT
Start: 2024-02-13 | End: 2024-02-17 | Stop reason: HOSPADM

## 2024-02-13 RX ORDER — CEFAZOLIN SODIUM 2 G/100ML
2000 INJECTION, SOLUTION INTRAVENOUS ONCE
Status: COMPLETED | OUTPATIENT
Start: 2024-02-13 | End: 2024-02-13

## 2024-02-13 RX ORDER — PANTOPRAZOLE SODIUM 40 MG/10ML
40 INJECTION, POWDER, LYOPHILIZED, FOR SOLUTION INTRAVENOUS ONCE
Status: DISCONTINUED | OUTPATIENT
Start: 2024-02-13 | End: 2024-02-17 | Stop reason: HOSPADM

## 2024-02-13 RX ORDER — CHLORHEXIDINE GLUCONATE ORAL RINSE 1.2 MG/ML
15 SOLUTION DENTAL EVERY 12 HOURS
Status: DISCONTINUED | OUTPATIENT
Start: 2024-02-13 | End: 2024-02-16

## 2024-02-13 RX ORDER — ATORVASTATIN CALCIUM 20 MG/1
40 TABLET, FILM COATED ORAL NIGHTLY
Status: DISCONTINUED | OUTPATIENT
Start: 2024-02-13 | End: 2024-02-17 | Stop reason: HOSPADM

## 2024-02-13 RX ORDER — BISACODYL 10 MG
10 SUPPOSITORY, RECTAL RECTAL DAILY PRN
Status: DISCONTINUED | OUTPATIENT
Start: 2024-02-14 | End: 2024-02-17 | Stop reason: HOSPADM

## 2024-02-13 RX ORDER — ACETAMINOPHEN 325 MG/1
650 TABLET ORAL EVERY 4 HOURS PRN
Status: DISCONTINUED | OUTPATIENT
Start: 2024-02-14 | End: 2024-02-17 | Stop reason: HOSPADM

## 2024-02-13 RX ORDER — ACETAMINOPHEN 650 MG/1
650 SUPPOSITORY RECTAL EVERY 4 HOURS PRN
Status: DISCONTINUED | OUTPATIENT
Start: 2024-02-14 | End: 2024-02-17 | Stop reason: HOSPADM

## 2024-02-13 RX ORDER — HYDROCODONE BITARTRATE AND ACETAMINOPHEN 5; 325 MG/1; MG/1
2 TABLET ORAL EVERY 4 HOURS PRN
Status: DISCONTINUED | OUTPATIENT
Start: 2024-02-13 | End: 2024-02-17 | Stop reason: HOSPADM

## 2024-02-13 RX ORDER — MAGNESIUM SULFATE HEPTAHYDRATE 500 MG/ML
INJECTION, SOLUTION INTRAMUSCULAR; INTRAVENOUS AS NEEDED
Status: DISCONTINUED | OUTPATIENT
Start: 2024-02-13 | End: 2024-02-13 | Stop reason: SURG

## 2024-02-13 RX ORDER — CHLORHEXIDINE GLUCONATE 500 MG/1
CLOTH TOPICAL EVERY 12 HOURS PRN
Status: DISCONTINUED | OUTPATIENT
Start: 2024-02-13 | End: 2024-02-13 | Stop reason: HOSPADM

## 2024-02-13 RX ORDER — SODIUM CHLORIDE 0.9 % (FLUSH) 0.9 %
3 SYRINGE (ML) INJECTION EVERY 12 HOURS SCHEDULED
Status: DISCONTINUED | OUTPATIENT
Start: 2024-02-13 | End: 2024-02-13 | Stop reason: HOSPADM

## 2024-02-13 RX ADMIN — ALBUMIN (HUMAN) 250 ML: 12.5 INJECTION, SOLUTION INTRAVENOUS at 17:48

## 2024-02-13 RX ADMIN — SODIUM CHLORIDE: 9 INJECTION, SOLUTION INTRAVENOUS at 12:17

## 2024-02-13 RX ADMIN — FENTANYL CITRATE 150 MCG: 50 INJECTION, SOLUTION INTRAMUSCULAR; INTRAVENOUS at 14:32

## 2024-02-13 RX ADMIN — METOCLOPRAMIDE 10 MG: 5 INJECTION, SOLUTION INTRAMUSCULAR; INTRAVENOUS at 21:45

## 2024-02-13 RX ADMIN — NICARDIPINE HYDROCHLORIDE 5 MG/HR: 25 INJECTION, SOLUTION INTRAVENOUS at 14:41

## 2024-02-13 RX ADMIN — ROCURONIUM BROMIDE 10 MG: 10 INJECTION, SOLUTION INTRAVENOUS at 12:55

## 2024-02-13 RX ADMIN — ACETAMINOPHEN 1000 MG: 10 INJECTION, SOLUTION INTRAVENOUS at 16:21

## 2024-02-13 RX ADMIN — PHENYLEPHRINE HYDROCHLORIDE 0.2 MCG/KG/MIN: 10 INJECTION, SOLUTION INTRAVENOUS at 12:30

## 2024-02-13 RX ADMIN — MIDAZOLAM 0.5 MG: 1 INJECTION INTRAMUSCULAR; INTRAVENOUS at 10:22

## 2024-02-13 RX ADMIN — ALBUMIN (HUMAN) 250 ML: 12.5 INJECTION, SOLUTION INTRAVENOUS at 17:06

## 2024-02-13 RX ADMIN — SODIUM CHLORIDE 30 ML/HR: 9 INJECTION, SOLUTION INTRAVENOUS at 16:07

## 2024-02-13 RX ADMIN — PROTAMINE SULFATE 300 MG: 10 INJECTION, SOLUTION INTRAVENOUS at 14:33

## 2024-02-13 RX ADMIN — MAGNESIUM SULFATE HEPTAHYDRATE 2 G: 500 INJECTION, SOLUTION INTRAMUSCULAR; INTRAVENOUS at 14:21

## 2024-02-13 RX ADMIN — ROCURONIUM BROMIDE 30 MG: 10 INJECTION, SOLUTION INTRAVENOUS at 13:49

## 2024-02-13 RX ADMIN — FENTANYL CITRATE 100 MCG: 50 INJECTION, SOLUTION INTRAMUSCULAR; INTRAVENOUS at 14:42

## 2024-02-13 RX ADMIN — ATORVASTATIN CALCIUM 40 MG: 20 TABLET, FILM COATED ORAL at 20:11

## 2024-02-13 RX ADMIN — FENTANYL CITRATE 150 MCG: 50 INJECTION, SOLUTION INTRAMUSCULAR; INTRAVENOUS at 12:27

## 2024-02-13 RX ADMIN — MIDAZOLAM 0.5 MG: 1 INJECTION INTRAMUSCULAR; INTRAVENOUS at 10:36

## 2024-02-13 RX ADMIN — DEXMEDETOMIDINE HYDROCHLORIDE IN SODIUM CHLORIDE 0.5 MCG/KG/HR: 4 INJECTION INTRAVENOUS at 17:07

## 2024-02-13 RX ADMIN — PROPOFOL 25 MCG/KG/MIN: 10 INJECTION, EMULSION INTRAVENOUS at 17:07

## 2024-02-13 RX ADMIN — FENTANYL CITRATE 25 MCG: 50 INJECTION, SOLUTION INTRAMUSCULAR; INTRAVENOUS at 10:32

## 2024-02-13 RX ADMIN — MIDAZOLAM 0.5 MG: 1 INJECTION INTRAMUSCULAR; INTRAVENOUS at 10:31

## 2024-02-13 RX ADMIN — ALBUMIN (HUMAN) 250 ML: 12.5 INJECTION, SOLUTION INTRAVENOUS at 21:46

## 2024-02-13 RX ADMIN — 0.12% CHLORHEXIDINE GLUCONATE 15 ML: 1.2 RINSE ORAL at 11:46

## 2024-02-13 RX ADMIN — CEFAZOLIN SODIUM 2 G: 2 INJECTION, SOLUTION INTRAVENOUS at 14:43

## 2024-02-13 RX ADMIN — MIDAZOLAM 1 MG: 1 INJECTION INTRAMUSCULAR; INTRAVENOUS at 08:48

## 2024-02-13 RX ADMIN — MUPIROCIN 1 APPLICATION: 20 OINTMENT TOPICAL at 20:11

## 2024-02-13 RX ADMIN — HEPARIN SODIUM 28000 UNITS: 1000 INJECTION, SOLUTION INTRAVENOUS; SUBCUTANEOUS at 13:14

## 2024-02-13 RX ADMIN — CEFAZOLIN SODIUM 2 G: 2 INJECTION, SOLUTION INTRAVENOUS at 12:34

## 2024-02-13 RX ADMIN — METOPROLOL TARTRATE 12.5 MG: 25 TABLET, FILM COATED ORAL at 11:46

## 2024-02-13 RX ADMIN — LIDOCAINE HYDROCHLORIDE 90 MG: 20 INJECTION, SOLUTION INFILTRATION; PERINEURAL at 12:27

## 2024-02-13 RX ADMIN — AMINOCAPROIC ACID 10 G: 250 INJECTION, SOLUTION INTRAVENOUS at 13:03

## 2024-02-13 RX ADMIN — FENTANYL CITRATE 25 MCG: 50 INJECTION, SOLUTION INTRAMUSCULAR; INTRAVENOUS at 10:21

## 2024-02-13 RX ADMIN — SODIUM CHLORIDE 0.5 MCG/KG/HR: 9 INJECTION, SOLUTION INTRAVENOUS at 12:36

## 2024-02-13 RX ADMIN — PHENYLEPHRINE HYDROCHLORIDE 100 MCG: 10 INJECTION INTRAVENOUS at 12:30

## 2024-02-13 RX ADMIN — FENTANYL CITRATE 100 MCG: 50 INJECTION, SOLUTION INTRAMUSCULAR; INTRAVENOUS at 12:58

## 2024-02-13 RX ADMIN — MIDAZOLAM 2 MG: 1 INJECTION INTRAMUSCULAR; INTRAVENOUS at 12:23

## 2024-02-13 RX ADMIN — AMINOCAPROIC ACID 10 G: 250 INJECTION, SOLUTION INTRAVENOUS at 14:43

## 2024-02-13 RX ADMIN — PROPOFOL 100 MG: 10 INJECTION, EMULSION INTRAVENOUS at 12:27

## 2024-02-13 RX ADMIN — OXYCODONE HYDROCHLORIDE 10 MG: 5 TABLET ORAL at 21:51

## 2024-02-13 RX ADMIN — PROPOFOL 25 MCG/KG/MIN: 10 INJECTION, EMULSION INTRAVENOUS at 12:36

## 2024-02-13 RX ADMIN — ROCURONIUM BROMIDE 90 MG: 10 INJECTION, SOLUTION INTRAVENOUS at 12:27

## 2024-02-13 RX ADMIN — ALBUMIN (HUMAN) 250 ML: 12.5 INJECTION, SOLUTION INTRAVENOUS at 19:45

## 2024-02-13 RX ADMIN — FAMOTIDINE 20 MG: 10 INJECTION, SOLUTION INTRAVENOUS at 10:11

## 2024-02-13 RX ADMIN — PHENYLEPHRINE HYDROCHLORIDE 100 MCG: 10 INJECTION INTRAVENOUS at 12:43

## 2024-02-13 RX ADMIN — 0.12% CHLORHEXIDINE GLUCONATE 15 ML: 1.2 RINSE ORAL at 17:07

## 2024-02-13 RX ADMIN — METOCLOPRAMIDE 10 MG: 5 INJECTION, SOLUTION INTRAMUSCULAR; INTRAVENOUS at 16:22

## 2024-02-13 RX ADMIN — CEFAZOLIN SODIUM 2000 MG: 2 INJECTION, SOLUTION INTRAVENOUS at 21:43

## 2024-02-13 RX ADMIN — MORPHINE SULFATE 1 MG: 2 INJECTION, SOLUTION INTRAMUSCULAR; INTRAVENOUS at 20:05

## 2024-02-13 NOTE — PLAN OF CARE
Goal Outcome Evaluation:  Plan of Care Reviewed With: family        Progress: improving  Outcome Evaluation: POD 0 s/p AVR. Out from OR at 1550. All VSS. A paced at 80, MAP maintaining >65. Only on minimal sedation, actively weaning per protocol. Monitoring outputs closely, WCTM.

## 2024-02-14 ENCOUNTER — APPOINTMENT (OUTPATIENT)
Dept: GENERAL RADIOLOGY | Facility: HOSPITAL | Age: 69
End: 2024-02-14
Payer: MEDICARE

## 2024-02-14 LAB
ALBUMIN SERPL-MCNC: 4.2 G/DL (ref 3.5–5.2)
ANION GAP SERPL CALCULATED.3IONS-SCNC: 11.1 MMOL/L (ref 5–15)
BASOPHILS # BLD AUTO: 0.01 10*3/MM3 (ref 0–0.2)
BASOPHILS NFR BLD AUTO: 0.2 % (ref 0–1.5)
BUN SERPL-MCNC: 14 MG/DL (ref 8–23)
BUN/CREAT SERPL: 13.1 (ref 7–25)
CA-I BLD-MCNC: 4.4 MG/DL (ref 4.6–5.4)
CA-I SERPL ISE-MCNC: 1.1 MMOL/L (ref 1.15–1.35)
CALCIUM SPEC-SCNC: 8.3 MG/DL (ref 8.6–10.5)
CHLORIDE SERPL-SCNC: 107 MMOL/L (ref 98–107)
CO2 SERPL-SCNC: 20.9 MMOL/L (ref 22–29)
CREAT SERPL-MCNC: 1.07 MG/DL (ref 0.76–1.27)
DEPRECATED RDW RBC AUTO: 43.9 FL (ref 37–54)
EGFRCR SERPLBLD CKD-EPI 2021: 74.7 ML/MIN/1.73
EOSINOPHIL # BLD AUTO: 0.03 10*3/MM3 (ref 0–0.4)
EOSINOPHIL NFR BLD AUTO: 0.5 % (ref 0.3–6.2)
ERYTHROCYTE [DISTWIDTH] IN BLOOD BY AUTOMATED COUNT: 13.5 % (ref 12.3–15.4)
GLUCOSE BLDC GLUCOMTR-MCNC: 118 MG/DL (ref 70–130)
GLUCOSE BLDC GLUCOMTR-MCNC: 123 MG/DL (ref 70–130)
GLUCOSE BLDC GLUCOMTR-MCNC: 125 MG/DL (ref 70–130)
GLUCOSE BLDC GLUCOMTR-MCNC: 126 MG/DL (ref 70–130)
GLUCOSE BLDC GLUCOMTR-MCNC: 130 MG/DL (ref 70–130)
GLUCOSE BLDC GLUCOMTR-MCNC: 144 MG/DL (ref 70–130)
GLUCOSE SERPL-MCNC: 131 MG/DL (ref 65–99)
HCT VFR BLD AUTO: 34.5 % (ref 37.5–51)
HGB BLD-MCNC: 11.4 G/DL (ref 13–17.7)
IMM GRANULOCYTES # BLD AUTO: 0.02 10*3/MM3 (ref 0–0.05)
IMM GRANULOCYTES NFR BLD AUTO: 0.3 % (ref 0–0.5)
INR PPP: 1.32 (ref 0.9–1.1)
LYMPHOCYTES # BLD AUTO: 0.86 10*3/MM3 (ref 0.7–3.1)
LYMPHOCYTES NFR BLD AUTO: 14.3 % (ref 19.6–45.3)
MAGNESIUM SERPL-MCNC: 2.9 MG/DL (ref 1.6–2.4)
MCH RBC QN AUTO: 29.8 PG (ref 26.6–33)
MCHC RBC AUTO-ENTMCNC: 33 G/DL (ref 31.5–35.7)
MCV RBC AUTO: 90.3 FL (ref 79–97)
MONOCYTES # BLD AUTO: 0.75 10*3/MM3 (ref 0.1–0.9)
MONOCYTES NFR BLD AUTO: 12.5 % (ref 5–12)
NEUTROPHILS NFR BLD AUTO: 4.35 10*3/MM3 (ref 1.7–7)
NEUTROPHILS NFR BLD AUTO: 72.2 % (ref 42.7–76)
NRBC BLD AUTO-RTO: 0 /100 WBC (ref 0–0.2)
PHOSPHATE SERPL-MCNC: 4.2 MG/DL (ref 2.5–4.5)
PLATELET # BLD AUTO: 102 10*3/MM3 (ref 140–450)
PMV BLD AUTO: 9.9 FL (ref 6–12)
POTASSIUM SERPL-SCNC: 4.3 MMOL/L (ref 3.5–5.2)
PROTHROMBIN TIME: 16.5 SECONDS (ref 11.7–14.2)
QT INTERVAL: 388 MS
QTC INTERVAL: 422 MS
RBC # BLD AUTO: 3.82 10*6/MM3 (ref 4.14–5.8)
SODIUM SERPL-SCNC: 139 MMOL/L (ref 136–145)
WBC NRBC COR # BLD AUTO: 6.02 10*3/MM3 (ref 3.4–10.8)

## 2024-02-14 PROCEDURE — 83735 ASSAY OF MAGNESIUM: CPT | Performed by: NURSE PRACTITIONER

## 2024-02-14 PROCEDURE — 85025 COMPLETE CBC W/AUTO DIFF WBC: CPT | Performed by: NURSE PRACTITIONER

## 2024-02-14 PROCEDURE — 85610 PROTHROMBIN TIME: CPT | Performed by: NURSE PRACTITIONER

## 2024-02-14 PROCEDURE — 25010000002 FUROSEMIDE PER 20 MG

## 2024-02-14 PROCEDURE — 82948 REAGENT STRIP/BLOOD GLUCOSE: CPT

## 2024-02-14 PROCEDURE — 25010000002 MAGNESIUM SULFATE 2 GM/50ML SOLUTION: Performed by: NURSE PRACTITIONER

## 2024-02-14 PROCEDURE — 93005 ELECTROCARDIOGRAM TRACING: CPT | Performed by: NURSE PRACTITIONER

## 2024-02-14 PROCEDURE — 80069 RENAL FUNCTION PANEL: CPT | Performed by: NURSE PRACTITIONER

## 2024-02-14 PROCEDURE — 25010000002 METOCLOPRAMIDE PER 10 MG: Performed by: NURSE PRACTITIONER

## 2024-02-14 PROCEDURE — 25010000002 CALCIUM GLUCONATE-NACL 1-0.675 GM/50ML-% SOLUTION: Performed by: THORACIC SURGERY (CARDIOTHORACIC VASCULAR SURGERY)

## 2024-02-14 PROCEDURE — 97162 PT EVAL MOD COMPLEX 30 MIN: CPT

## 2024-02-14 PROCEDURE — 25010000002 MORPHINE PER 10 MG: Performed by: NURSE PRACTITIONER

## 2024-02-14 PROCEDURE — 25010000002 ENOXAPARIN PER 10 MG: Performed by: NURSE PRACTITIONER

## 2024-02-14 PROCEDURE — 25010000002 ACETAMINOPHEN 10 MG/ML SOLUTION: Performed by: NURSE PRACTITIONER

## 2024-02-14 PROCEDURE — 25010000002 CALCIUM GLUCONATE-NACL 1-0.675 GM/50ML-% SOLUTION

## 2024-02-14 PROCEDURE — 93010 ELECTROCARDIOGRAM REPORT: CPT | Performed by: INTERNAL MEDICINE

## 2024-02-14 PROCEDURE — 82330 ASSAY OF CALCIUM: CPT | Performed by: NURSE PRACTITIONER

## 2024-02-14 PROCEDURE — 97110 THERAPEUTIC EXERCISES: CPT

## 2024-02-14 PROCEDURE — 25010000002 CEFAZOLIN IN DEXTROSE 2-4 GM/100ML-% SOLUTION: Performed by: NURSE PRACTITIONER

## 2024-02-14 PROCEDURE — 71045 X-RAY EXAM CHEST 1 VIEW: CPT

## 2024-02-14 PROCEDURE — 25010000002 CEFAZOLIN IN DEXTROSE 2000 MG/ 100 ML SOLUTION: Performed by: NURSE PRACTITIONER

## 2024-02-14 PROCEDURE — 99024 POSTOP FOLLOW-UP VISIT: CPT | Performed by: THORACIC SURGERY (CARDIOTHORACIC VASCULAR SURGERY)

## 2024-02-14 RX ORDER — NICOTINE POLACRILEX 4 MG
15 LOZENGE BUCCAL
Status: DISCONTINUED | OUTPATIENT
Start: 2024-02-14 | End: 2024-02-17 | Stop reason: HOSPADM

## 2024-02-14 RX ORDER — INSULIN LISPRO 100 [IU]/ML
2-7 INJECTION, SOLUTION INTRAVENOUS; SUBCUTANEOUS
Status: DISCONTINUED | OUTPATIENT
Start: 2024-02-14 | End: 2024-02-15

## 2024-02-14 RX ORDER — POTASSIUM CHLORIDE 750 MG/1
20 TABLET, FILM COATED, EXTENDED RELEASE ORAL ONCE
Status: COMPLETED | OUTPATIENT
Start: 2024-02-14 | End: 2024-02-14

## 2024-02-14 RX ORDER — DEXTROSE MONOHYDRATE 25 G/50ML
25 INJECTION, SOLUTION INTRAVENOUS
Status: DISCONTINUED | OUTPATIENT
Start: 2024-02-14 | End: 2024-02-17 | Stop reason: HOSPADM

## 2024-02-14 RX ORDER — GUAIFENESIN 600 MG/1
1200 TABLET, EXTENDED RELEASE ORAL EVERY 12 HOURS SCHEDULED
Status: DISCONTINUED | OUTPATIENT
Start: 2024-02-14 | End: 2024-02-17 | Stop reason: HOSPADM

## 2024-02-14 RX ORDER — IBUPROFEN 600 MG/1
1 TABLET ORAL
Status: DISCONTINUED | OUTPATIENT
Start: 2024-02-14 | End: 2024-02-17 | Stop reason: HOSPADM

## 2024-02-14 RX ORDER — FUROSEMIDE 10 MG/ML
20 INJECTION INTRAMUSCULAR; INTRAVENOUS ONCE
Status: COMPLETED | OUTPATIENT
Start: 2024-02-14 | End: 2024-02-14

## 2024-02-14 RX ORDER — CALCIUM GLUCONATE 20 MG/ML
1000 INJECTION, SOLUTION INTRAVENOUS ONCE
Status: COMPLETED | OUTPATIENT
Start: 2024-02-14 | End: 2024-02-14

## 2024-02-14 RX ADMIN — ACETAMINOPHEN 1000 MG: 10 INJECTION, SOLUTION INTRAVENOUS at 00:15

## 2024-02-14 RX ADMIN — CEFAZOLIN SODIUM 2000 MG: 2 INJECTION, SOLUTION INTRAVENOUS at 15:18

## 2024-02-14 RX ADMIN — CEFAZOLIN SODIUM 2000 MG: 2 INJECTION, SOLUTION INTRAVENOUS at 21:53

## 2024-02-14 RX ADMIN — OXYCODONE HYDROCHLORIDE 10 MG: 5 TABLET ORAL at 12:31

## 2024-02-14 RX ADMIN — ACETAMINOPHEN 1000 MG: 10 INJECTION, SOLUTION INTRAVENOUS at 08:16

## 2024-02-14 RX ADMIN — GUAIFENESIN 1200 MG: 600 TABLET, EXTENDED RELEASE ORAL at 20:06

## 2024-02-14 RX ADMIN — METOPROLOL TARTRATE 12.5 MG: 25 TABLET, FILM COATED ORAL at 12:11

## 2024-02-14 RX ADMIN — CALCIUM GLUCONATE 1000 MG: 20 INJECTION, SOLUTION INTRAVENOUS at 07:00

## 2024-02-14 RX ADMIN — HYDROCODONE BITARTRATE AND ACETAMINOPHEN 2 TABLET: 5; 325 TABLET ORAL at 20:05

## 2024-02-14 RX ADMIN — ACETAMINOPHEN 325MG 650 MG: 325 TABLET ORAL at 12:11

## 2024-02-14 RX ADMIN — DOCUSATE SODIUM 50MG AND SENNOSIDES 8.6MG 2 TABLET: 8.6; 5 TABLET, FILM COATED ORAL at 20:06

## 2024-02-14 RX ADMIN — MUPIROCIN 1 APPLICATION: 20 OINTMENT TOPICAL at 20:06

## 2024-02-14 RX ADMIN — MAGNESIUM SULFATE HEPTAHYDRATE 2 G: 40 INJECTION, SOLUTION INTRAVENOUS at 00:15

## 2024-02-14 RX ADMIN — MUPIROCIN 1 APPLICATION: 20 OINTMENT TOPICAL at 08:46

## 2024-02-14 RX ADMIN — CALCIUM GLUCONATE 1000 MG: 20 INJECTION, SOLUTION INTRAVENOUS at 08:57

## 2024-02-14 RX ADMIN — OXYCODONE HYDROCHLORIDE 10 MG: 5 TABLET ORAL at 04:40

## 2024-02-14 RX ADMIN — FUROSEMIDE 20 MG: 10 INJECTION, SOLUTION INTRAMUSCULAR; INTRAVENOUS at 08:38

## 2024-02-14 RX ADMIN — CYCLOBENZAPRINE 10 MG: 10 TABLET, FILM COATED ORAL at 07:42

## 2024-02-14 RX ADMIN — ALPRAZOLAM 0.25 MG: 0.25 TABLET ORAL at 12:11

## 2024-02-14 RX ADMIN — BISACODYL 10 MG: 5 TABLET, COATED ORAL at 20:06

## 2024-02-14 RX ADMIN — OXYCODONE HYDROCHLORIDE 10 MG: 5 TABLET ORAL at 16:38

## 2024-02-14 RX ADMIN — POTASSIUM CHLORIDE 20 MEQ: 750 TABLET, EXTENDED RELEASE ORAL at 10:44

## 2024-02-14 RX ADMIN — METOPROLOL TARTRATE 12.5 MG: 25 TABLET, FILM COATED ORAL at 20:05

## 2024-02-14 RX ADMIN — 0.12% CHLORHEXIDINE GLUCONATE 15 ML: 1.2 RINSE ORAL at 03:51

## 2024-02-14 RX ADMIN — MORPHINE SULFATE 1 MG: 2 INJECTION, SOLUTION INTRAMUSCULAR; INTRAVENOUS at 15:18

## 2024-02-14 RX ADMIN — OXYCODONE HYDROCHLORIDE 10 MG: 5 TABLET ORAL at 08:27

## 2024-02-14 RX ADMIN — METOCLOPRAMIDE 10 MG: 5 INJECTION, SOLUTION INTRAMUSCULAR; INTRAVENOUS at 03:50

## 2024-02-14 RX ADMIN — ATORVASTATIN CALCIUM 40 MG: 20 TABLET, FILM COATED ORAL at 20:05

## 2024-02-14 RX ADMIN — CYCLOBENZAPRINE 10 MG: 10 TABLET, FILM COATED ORAL at 15:18

## 2024-02-14 RX ADMIN — 0.12% CHLORHEXIDINE GLUCONATE 15 ML: 1.2 RINSE ORAL at 16:37

## 2024-02-14 RX ADMIN — GUAIFENESIN 1200 MG: 600 TABLET, EXTENDED RELEASE ORAL at 08:38

## 2024-02-14 RX ADMIN — PANTOPRAZOLE SODIUM 40 MG: 40 TABLET, DELAYED RELEASE ORAL at 06:03

## 2024-02-14 RX ADMIN — ASPIRIN 81 MG: 81 TABLET, COATED ORAL at 08:17

## 2024-02-14 RX ADMIN — ENOXAPARIN SODIUM 40 MG: 100 INJECTION SUBCUTANEOUS at 20:06

## 2024-02-14 RX ADMIN — CEFAZOLIN SODIUM 2000 MG: 2 INJECTION, SOLUTION INTRAVENOUS at 05:49

## 2024-02-14 NOTE — OP NOTE
Operative Note    Date of Dictation: 02/14/24    Date of Procedure: 02/13/2024    Referring Physician: Andrea Hwang MD    Preoperative diagnosis:   1.  Severe aortic stenosis  2.  Nonrheumatic degenerative calcific aortic stenosis  3.  Fatigue and dyspnea    Postoperative diagnosis:   Same    Procedure:   Minimally invasive (upper J sternotomy) elective AVR with a 27 mm magna pericardial prosthesis    Surgeon: Jordan Vincent MD     Assistants: Assistant: Chelle Alonzo PA was responsible for performing the following activities: Retraction, Suction, Irrigation, Suturing, Closing, Placing Dressing, and all aspects of the complex cardiac case  and their skilled assistance was necessary for the success of this case.    Anesthesia: General endotracheal anesthesia and CIERRA    Findings:  Trileaflet calcific aortic valve with severe stenosis    Estimated Blood Loss: Approximately 400 cc, most of it recovered with a Cell Saver device and cardiotomy suckers and was retransfuse to the patient    STS Data:    The patient was explained the risks (STS risk score calculated), benefits and alternatives of surgery and agreed to proceed. The antibiotics and b blockers were given in the STS required window.  Counseling was given about diet, alcohol and tobacco use as needed    Description of the procedure:     The patient was placed supine on the operative table. Anesthesia was given and lines placed. The patient was prepped and draped using the usual sterile technique. A 8 cm upper median sternotomy was performed with a scalpel and the layers carried down to the sternum using the electrocautery. The sternum was splited in the midline into the right third intercostal space using a vertical oscillating saw.   Small Finochietto retractor was placed and anterior mediastinal was exposed the pericardium was opened in the upper part and the edges were tacked to the wound.  Hemostasis was achieved. 300 units of IV heparin was  given to maintain the ACT over 400.  Cannulation sutures were placed in the ascending aorta and right atrium. Small cannulas were placed and aorto right atrial cardiopulmonary bypass was started. Cardioplegia cannulas were placed and then the ascending aorta was clamped. One liter of cold blood cardioplegia was given in an antegrade fashion to achieved diastolic arrest and further doses every 15 minutes thereafter also using retrograde infusion.   A transverse proximal aortotomy was performed and the valve was exposed. The valve was trileaflet and severely calcific. The leaflets were resected and annulus debrided and washed out. The annulus was sized to a 27 mm magna pericardial prosthesis that was washed as recommended by the . 2-0 Prolene continuous suture was used to anastomose each third of the valve with the corresponding part of the annulus. The valve was seated and the knots secured behind each post. The aortotomy was closed with a 4.0 prolene continuous suture using the double layer McGoon technique.  Patient was systemically rewarmed, the warm dose of cardioplegia was given and then the root was de-aired and the aortic clamp removed with suction on the aortic vent.  The left pleural space was suctioned and the lungs ventilated. The heart was paced till regular atrial rhythm resumed. I allowed the heart to eject  and I de-aired the left heart chambers under CIERRA guidance and once hemodynamics were acceptable, then the CPB was discontinued and the venous and cardioplegia cannulas removed. The matching dose of protamine was given and the aortic cannula removed as well. AV temporary wires and pleural and mediastinal chest tubes were placed and the wound sprayed with platelet rich plasma.  The perioperative CIERRA showed the valve well seated without perivalvular leaks and mean gradient of 5 mm.  The sternum was closed with single and double wires and soft tissue in layers of reabsorbable material. The  wounds were covered with sterile dressings.       Specimen removed: Aortic valve leaflets    CPB time: 67 minutes    Aortic clamp time: 52 minutes    Complications:  none           Disposition: Cardiovascular recovery room           Condition: Critical but stable.

## 2024-02-14 NOTE — THERAPY EVALUATION
Patient Name: Timo Aceves  : 1955    MRN: [id number]                              Today's Date: 2024       Admit Date: 2024    Visit Dx:     ICD-10-CM ICD-9-CM   1. Aortic stenosis, severe  I35.0 424.1     Patient Active Problem List   Diagnosis    Aortic stenosis, severe    Medial meniscus, posterior horn derangement, right    Complex tear of medial meniscus of right knee as current injury    Aortic stenosis     Past Medical History:   Diagnosis Date    Arthritis     Coronary artery disease     AORTIC STENOSIS    Elevated cholesterol     Heart murmur     Hyperlipidemia      Past Surgical History:   Procedure Laterality Date    AORTIC VALVE REPAIR/REPLACEMENT N/A 2024    Procedure: AORTIC VALVE REPLACEMENT with mini-sternotomy TRANSESOPHAGEAL ECHOCARDIOGRAM WITH ANESTHESIA AND PRP;  Surgeon: Jordan Vincent MD;  Location: [facility] CV[facility];  Service: Cardiothoracic;  Laterality: N/A;    CARDIAC CATHETERIZATION N/A 2023    Procedure: Coronaries only;  Surgeon: MAVIS Hwang MD;  Location: [facility] INVASIVE LOCATION;  Service: Cardiology;  Laterality: N/A;    ORIF CLAVICLE FRACTURE Right       General Information       Row Name 24 1003          Physical Therapy Time and Intention    Document Type evaluation  -PC     Mode of Treatment physical therapy  -PC       Row Name 24 1003          General Information    Patient Profile Reviewed yes  -PC     Prior Level of Function independent:  -PC     Existing Precautions/Restrictions cardiac;sternal;fall  -PC     Barriers to Rehab none identified  -PC       Row Name 24 1003          Living Environment    People in Home spouse  -PC       Row Name 24 1003          Cognition    Orientation Status (Cognition) oriented x 3  -PC       Row Name 24 1003          Safety Issues, Functional Mobility    Impairments Affecting Function (Mobility) endurance/activity tolerance;shortness of breath;strength;pain  -PC                User Key  (r) = Recorded By, (t) = Taken By, (c) = Cosigned By      Initials Name Provider Type    PC Grace Brock PT Physical Therapist                   Mobility       Row Name 02/14/24 1004          Bed Mobility    Comment, (Bed Mobility) up in chair  -PC       Row Name 02/14/24 1004          Sit-Stand Transfer    Sit-Stand [place] (Transfers) minimum assist (75% patient effort);1 person assist;1 person to manage equipment  -PC       Row Name 02/14/24 1004          Gait/Stairs (Locomotion)    [place] Level (Gait) minimum assist (75% patient effort);1 person assist;1 person to manage equipment  -PC     Distance in Feet (Gait) 75 ft  -PC     Deviations/Abnormal Patterns (Gait) antalgic;stride length decreased;gait speed decreased  -PC     Bilateral Gait Deviations forward flexed posture  -PC               User Key  (r) = Recorded By, (t) = Taken By, (c) = Cosigned By      Initials Name Provider Type    PC Grace Brock PT Physical Therapist                   Obj/Interventions       Row Name 02/14/24 1006          Range of Motion Comprehensive    General Range of Motion no range of motion deficits identified  -PC       Row Name 02/14/24 1006          Strength Comprehensive (MMT)    Comment, General Manual Muscle Testing (MMT) Assessment no focal deficits, general post op weakness present  -PC       Row Name 02/14/24 1006          Motor Skills    Therapeutic Exercise --  5 reps cardiac protocol  -[facility] Name 02/14/24 1006          Balance    Comment, Balance WNL  -PC               User Key  (r) = Recorded By, (t) = Taken By, (c) = Cosigned By      Initials Name Provider Type    PC Grace Brock PT Physical Therapist                   Goals/Plan       Row Name 02/14/24 1015 02/14/24 1014       Problem Specific Goal 1 (PT)    Problem Specific Goal 1 (PT) cardiac level 3  -PC cardiac level 3  -PC    Time Frame (Problem Specific Goal 1, PT) 1 week  -PC 1 week  -PC      [facility] Name 02/14/24  1015          Therapy Assessment/Plan (PT)    Planned Therapy Interventions (PT) bed mobility training;gait training;transfer training;strengthening  -PC               User Key  (r) = Recorded By, (t) = Taken By, (c) = Cosigned By      Initials Name Provider Type    PC Grace Brock, PT Physical Therapist                   Clinical Impression       Row Name 02/14/24 1008          Pain    Pre/Posttreatment Pain Comment expected post op pain, being addressed by nursing  -PC     Pain Intervention(s) Medication (See MAR);Repositioned  -PC       Row Name 02/14/24 1008          Plan of Care Review    Plan of Care Reviewed With patient  -PC     Outcome Evaluation Pt is POD 1 AVR, seen in CVR, sitting in a chair, pt is independent at baseline, lives with his spouse. Pt did well this morning, able to walk 75 ft with min assist. Pt presents with post op pain, weakness, and impaired functional mobility, he will benefit from PT to address. anticipate good progress with mobility and that pt will be able to go home at discharge  -PC       Row Name 02/14/24 1008          Therapy Assessment/Plan (PT)    Rehab Potential (PT) good, to achieve stated therapy goals  -PC     Criteria for Skilled Interventions Met (PT) yes;meets criteria  -PC     Therapy Frequency (PT) daily  -PC       Row Name 02/14/24 1008          Vital Signs    Pretreatment Heart Rate (beats/min) 86  -PC     Posttreatment Heart Rate (beats/min) 83  -PC     Pre SpO2 (%) 98  -PC     O2 Delivery Pre Treatment room air  -PC     Post SpO2 (%) 97  -PC     O2 Delivery Post Treatment room air  -PC       Row Name 02/14/24 1008          Positioning and Restraints    Pre-Treatment Position sitting in chair/recliner  -PC     Post Treatment Position chair  -PC     In Chair reclined;call light within reach;encouraged to call for assist;patient within staff view  -PC               User Key  (r) = Recorded By, (t) = Taken By, (c) = Cosigned By      Initials Name Provider Type     PC Grace Brock, PT Physical Therapist                   Outcome Measures       Row Name 02/14/24 1018          How much help from another person do you currently need...    Turning from your back to your side while in flat bed without using bedrails? 3  -PC     Moving from lying on back to sitting on the side of a flat bed without bedrails? 3  -PC     Moving to and from a bed to a chair (including a wheelchair)? 3  -PC     Standing up from a chair using your arms (e.g., wheelchair, bedside chair)? 3  -PC     Climbing 3-5 steps with a railing? 2  -PC     To walk in hospital room? 3  -PC     AM-PAC 6 Clicks Score (PT) 17  -PC     Highest Level of Mobility Goal 5 --> Static standing  -PC               User Key  (r) = Recorded By, (t) = Taken By, (c) = Cosigned By      Initials Name Provider Type    PC Grace Brock, PT Physical Therapist                                 Physical Therapy Education       Title: PT OT SLP Therapies (Done)       Topic: Physical Therapy (Done)       Point: Mobility training (Done)       Learning Progress Summary             Patient Acceptance, E,D, DU by PC at 2/14/2024 1018                         Point: Home exercise program (Done)       Learning Progress Summary             Patient Acceptance, E,D, DU by PC at 2/14/2024 1018                         Point: Body mechanics (Done)       Learning Progress Summary             Patient Acceptance, E,D, DU by PC at 2/14/2024 1018                         Point: Precautions (Done)       Learning Progress Summary             Patient Acceptance, E,D, DU by PC at 2/14/2024 1018                                         User Key       Initials Effective Dates Name Provider Type Discipline     06/16/21 -  Grace Brock PT Physical Therapist PT                  PT Recommendation and Plan  Planned Therapy Interventions (PT): bed mobility training, gait training, transfer training, strengthening  Plan of Care Reviewed With: patient  Outcome  Evaluation: Pt is POD 1 AVR, seen in CVR, sitting in a chair, pt is independent at baseline, lives with his spouse. Pt did well this morning, able to walk 75 ft with min assist. Pt presents with post op pain, weakness, and impaired functional mobility, he will benefit from PT to address. anticipate good progress with mobility and that pt will be able to go home at discharge     Time Calculation:         PT Charges       Row Name 02/14/24 1021             Time Calculation    Start Time 0830  -PC      Stop Time 0853  -PC      Time Calculation (min) 23 min  -PC      PT Received On 02/14/24  -PC      PT - Next Appointment 02/15/24  -PC      PT Goal Re-Cert Due Date 02/21/24  -PC                User Key  (r) = Recorded By, (t) = Taken By, (c) = Cosigned By      Initials Name Provider Type    PC Grace Brock, PT Physical Therapist                  Therapy Charges for Today       Code Description Service Date Service Provider Modifiers Qty    92425119567 HC PT EVAL MOD COMPLEXITY 2 2/14/2024 Grace Brock, PT GP 1    [id number] HC PT THER PROC EA 15 MIN 2/14/2024 Grace Brock, PT GP 1            PT G-Codes  AM-PAC 6 Clicks Score (PT): 17  PT Discharge Summary  Anticipated Discharge Disposition (PT): home with assist    Grace Brock PT  2/14/2024

## 2024-02-14 NOTE — PLAN OF CARE
Goal Outcome Evaluation:              Outcome Evaluation: VSS.A paced at 80, on a low dose of levophed. 3L/NC, up to chair, will continue to monitor.

## 2024-02-14 NOTE — PLAN OF CARE
Goal Outcome Evaluation:  Plan of Care Reviewed With: patient           Outcome Evaluation: Pt is POD 1 AVR, seen in CVR, sitting in a chair, pt is independent at baseline, lives with his spouse. Pt did well this morning, able to walk 75 ft with min assist. Pt presents with post op pain, weakness, and impaired functional mobility, he will benefit from PT to address. anticipate good progress with mobility and that pt will be able to go home at discharge      Anticipated Discharge Disposition (PT): home with assist

## 2024-02-14 NOTE — PROGRESS NOTES
LOS: 1 day   Patient Care Team:  George, Alexander Bancroft, MD as PCP - General (Internal Medicine)  MAVIS Hwang MD as Consulting Physician (Cardiology)  Alessio Patino MD as Consulting Physician (Cardiology)    Chief Complaint: Post op     Subjective:  Symptoms:  Stable.  He reports chest pressure.  No shortness of breath or chest pain.    Diet:  Poor intake.  No nausea or vomiting.    Activity level: Impaired due to weakness.    Pain:  He complains of pain that is mild.        Vital Signs  Temp:  [96.4 °F (35.8 °C)-98.8 °F (37.1 °C)] 98.1 °F (36.7 °C)  Heart Rate:  [72-89] 78  Resp:  [12-19] 19  BP: ()/(59-93) 95/71  FiO2 (%):  [39 %-100 %] 39 %  Body mass index is 27.18 kg/m².    Intake/Output Summary (Last 24 hours) at 2/14/2024 0723  Last data filed at 2/14/2024 0657  Gross per 24 hour   Intake 4523.67 ml   Output 3785 ml   Net 738.67 ml     No intake/output data recorded.    Chest tube drainage last 8 hours: 105 ml         02/14/24  0600   Weight: 90.9 kg (200 lb 6.4 oz)         Objective:  General Appearance:  Comfortable and in no acute distress.    Vital signs: (most recent): Blood pressure 95/71, pulse 78, temperature 98.1 °F (36.7 °C), temperature source Core, resp. rate 19, weight 90.9 kg (200 lb 6.4 oz), SpO2 100%.  Vital signs are normal.  No fever.    Output: Producing urine and no stool output.    HEENT: Normal HEENT exam.    Lungs:  Normal effort and normal respiratory rate.  He is not in respiratory distress.  There are decreased breath sounds.    Heart: Normal rate.  Regular rhythm.    Abdomen: Abdomen is soft and non-distended.  Hypoactive bowel sounds.   There is no abdominal tenderness.     Extremities: Normal range of motion.    Pulses: Distal pulses are intact.    Neurological: Patient is alert and oriented to person, place and time.    Pupils:  Pupils are equal, round, and reactive to light.    Skin:  Warm and dry.                Results Review:        WBC WBC   Date  Value Ref Range Status   02/14/2024 6.02 3.40 - 10.80 10*3/mm3 Final   02/13/2024 8.54 3.40 - 10.80 10*3/mm3 Final   02/13/2024 10.62 3.40 - 10.80 10*3/mm3 Final   02/13/2024 9.16 3.40 - 10.80 10*3/mm3 Final      HGB Hemoglobin   Date Value Ref Range Status   02/14/2024 11.4 (L) 13.0 - 17.7 g/dL Final   02/13/2024 13.0 13.0 - 17.7 g/dL Final   02/13/2024 13.3 13.0 - 17.7 g/dL Final   02/13/2024 11.2 (L) 12.0 - 17.0 g/dL Final   02/13/2024 11.7 (L) 13.0 - 17.7 g/dL Final   02/13/2024 11.9 (L) 12.0 - 17.0 g/dL Final   02/13/2024 11.2 (L) 12.0 - 17.0 g/dL Final   02/13/2024 10.9 (L) 12.0 - 17.0 g/dL Final   02/13/2024 13.3 12.0 - 17.0 g/dL Final      HCT Hematocrit   Date Value Ref Range Status   02/14/2024 34.5 (L) 37.5 - 51.0 % Final   02/13/2024 37.4 (L) 37.5 - 51.0 % Final   02/13/2024 39.2 37.5 - 51.0 % Final   02/13/2024 33 (L) 38 - 51 % Final   02/13/2024 34.4 (L) 37.5 - 51.0 % Final   02/13/2024 35 (L) 38 - 51 % Final   02/13/2024 33 (L) 38 - 51 % Final   02/13/2024 32 (L) 38 - 51 % Final   02/13/2024 39 38 - 51 % Final      Platelets Platelets   Date Value Ref Range Status   02/14/2024 102 (L) 140 - 450 10*3/mm3 Final   02/13/2024 121 (L) 140 - 450 10*3/mm3 Final   02/13/2024 144 140 - 450 10*3/mm3 Final   02/13/2024 121 (L) 140 - 450 10*3/mm3 Final        PT/INR:    Protime   Date Value Ref Range Status   02/14/2024 16.5 (H) 11.7 - 14.2 Seconds Final   02/13/2024 17.3 (H) 11.7 - 14.2 Seconds Final   02/13/2024 16.2 (H) 12.8 - 15.2 seconds Final     Comment:     Serial Number: [id number]:  [id number]   02/13/2024 18.9 (H) 11.7 - 14.2 Seconds Final   /  INR   Date Value Ref Range Status   02/14/2024 1.32 (H) 0.90 - 1.10 Final   02/13/2024 1.39 (H) 0.90 - 1.10 Final   02/13/2024 1.4 (H) 0.8 - 1.2 Final   02/13/2024 1.56 (H) 0.90 - 1.10 Final       Sodium Sodium   Date Value Ref Range Status   02/14/2024 139 136 - 145 mmol/L Final   02/13/2024 140 136 - 145 mmol/L Final   02/13/2024 137 136 - 145 mmol/L Final       Potassium Potassium   Date Value Ref Range Status   02/14/2024 4.3 3.5 - 5.2 mmol/L Final   02/13/2024 4.0 3.5 - 5.2 mmol/L Final   02/13/2024 4.6 3.5 - 5.2 mmol/L Final     Comment:     Slight hemolysis detected by analyzer. Result may be falsely elevated.      Chloride Chloride   Date Value Ref Range Status   02/14/2024 107 98 - 107 mmol/L Final   02/13/2024 108 (H) 98 - 107 mmol/L Final   02/13/2024 105 98 - 107 mmol/L Final      Bicarbonate CO2   Date Value Ref Range Status   02/14/2024 20.9 (L) 22.0 - 29.0 mmol/L Final   02/13/2024 19.8 (L) 22.0 - 29.0 mmol/L Final   02/13/2024 22.1 22.0 - 29.0 mmol/L Final      BUN BUN   Date Value Ref Range Status   02/14/2024 14 8 - 23 mg/dL Final   02/13/2024 14 8 - 23 mg/dL Final   02/13/2024 13 8 - 23 mg/dL Final      Creatinine Creatinine   Date Value Ref Range Status   02/14/2024 1.07 0.76 - 1.27 mg/dL Final   02/13/2024 1.07 0.76 - 1.27 mg/dL Final   02/13/2024 1.05 0.76 - 1.27 mg/dL Final      Calcium Calcium   Date Value Ref Range Status   02/14/2024 8.3 (L) 8.6 - 10.5 mg/dL Final   02/13/2024 8.2 (L) 8.6 - 10.5 mg/dL Final   02/13/2024 8.6 8.6 - 10.5 mg/dL Final      Magnesium Magnesium   Date Value Ref Range Status   02/14/2024 2.9 (H) 1.6 - 2.4 mg/dL Final   02/13/2024 2.2 1.6 - 2.4 mg/dL Final   02/13/2024 2.9 (H) 1.6 - 2.4 mg/dL Final          acetaminophen, 1,000 mg, Intravenous, Q8H  acetaminophen, 650 mg, Oral, Q4H   Or  acetaminophen, 650 mg, Oral, Q4H   Or  acetaminophen, 650 mg, Rectal, Q4H  aspirin, 81 mg, Oral, Daily  atorvastatin, 40 mg, Oral, Nightly  ceFAZolin, 2,000 mg, Intravenous, Q8H  chlorhexidine, 15 mL, Mouth/Throat, Q12H  enoxaparin, 40 mg, Subcutaneous, Daily  magnesium sulfate, 2 g, Intravenous, Q8H  metoclopramide, 10 mg, Intravenous, Q6H  metoprolol tartrate, 12.5 mg, Oral, Q12H  mupirocin, , Each Nare, BID  pantoprazole, 40 mg, Intravenous, Once   Followed by  pantoprazole, 40 mg, Oral, QAM  senna-docusate sodium, 2 tablet, Oral,  Nightly      clevidipine, 2-32 mg/hr  dexmedetomidine, 0.2-1.5 mcg/kg/hr, Last Rate: Stopped (02/14/24 0200)  DOPamine, 2-20 mcg/kg/min  EPINEPHrine, 0.02-0.1 mcg/kg/min  insulin, 0-100 Units/hr  milrinone, 0.25-0.375 mcg/kg/min  niCARdipine, 5-15 mg/hr, Last Rate: Stopped (02/13/24 1630)  nitroglycerin, 5-200 mcg/min  norepinephrine, 0.02-0.2 mcg/kg/min, Last Rate: 0.02 mcg/kg/min (02/14/24 0509)  phenylephrine, 0.2-2 mcg/kg/min  propofol, 5-50 mcg/kg/min, Last Rate: Stopped (02/13/24 1734)  sodium chloride, 30 mL/hr, Last Rate: 30 mL/hr (02/13/24 1607)          Aortic stenosis, severe    Aortic stenosis      Assessment & Plan    - severe aortic stenosis, s/p AVR--POD#1, Dr. Vincent  - hyperlipidemia  - post op anemia, expected ABL    Looks good; up in the chair  On 3L NC--wean as able. Encourage pulmonary toilet--add mucinex/flutter  NSR, rate 80's. On low dose levophed--wean as able. Replete calcium.   Gently IV diurese; replete electrolytes  Mobilize/increase ambulation--PT consulted  D/c swan and arterial line  Reassess CTs after ambulation  Continue routine care  [facility]    Shelia Olivia, ERIN  02/14/24  07:23 EST

## 2024-02-14 NOTE — PLAN OF CARE
Goal Outcome Evaluation:  Plan of Care Reviewed With: patient        Progress: improving  Outcome Evaluation: POD 1 s/p AVR. Transferred to CVU this afternoon. A&Ox4, all VSS. On RA. CT and jalloh output monitored closely. Pain treated per MAR. WCTM.

## 2024-02-15 ENCOUNTER — APPOINTMENT (OUTPATIENT)
Dept: GENERAL RADIOLOGY | Facility: HOSPITAL | Age: 69
End: 2024-02-15
Payer: MEDICARE

## 2024-02-15 LAB
ANION GAP SERPL CALCULATED.3IONS-SCNC: 9.5 MMOL/L (ref 5–15)
BUN SERPL-MCNC: 12 MG/DL (ref 8–23)
BUN/CREAT SERPL: 12.8 (ref 7–25)
CALCIUM SPEC-SCNC: 8.6 MG/DL (ref 8.6–10.5)
CHLORIDE SERPL-SCNC: 100 MMOL/L (ref 98–107)
CO2 SERPL-SCNC: 23.5 MMOL/L (ref 22–29)
CREAT SERPL-MCNC: 0.94 MG/DL (ref 0.76–1.27)
DEPRECATED RDW RBC AUTO: 43.6 FL (ref 37–54)
EGFRCR SERPLBLD CKD-EPI 2021: 87.2 ML/MIN/1.73
ERYTHROCYTE [DISTWIDTH] IN BLOOD BY AUTOMATED COUNT: 13.3 % (ref 12.3–15.4)
GLUCOSE BLDC GLUCOMTR-MCNC: 124 MG/DL (ref 70–130)
GLUCOSE SERPL-MCNC: 113 MG/DL (ref 65–99)
HCT VFR BLD AUTO: 34.4 % (ref 37.5–51)
HGB BLD-MCNC: 11.7 G/DL (ref 13–17.7)
LAB AP CASE REPORT: NORMAL
MCH RBC QN AUTO: 30.6 PG (ref 26.6–33)
MCHC RBC AUTO-ENTMCNC: 34 G/DL (ref 31.5–35.7)
MCV RBC AUTO: 90.1 FL (ref 79–97)
PATH REPORT.FINAL DX SPEC: NORMAL
PATH REPORT.GROSS SPEC: NORMAL
PLATELET # BLD AUTO: 113 10*3/MM3 (ref 140–450)
PMV BLD AUTO: 10.5 FL (ref 6–12)
POTASSIUM SERPL-SCNC: 3.7 MMOL/L (ref 3.5–5.2)
POTASSIUM SERPL-SCNC: 4.1 MMOL/L (ref 3.5–5.2)
QT INTERVAL: 333 MS
QTC INTERVAL: 406 MS
RBC # BLD AUTO: 3.82 10*6/MM3 (ref 4.14–5.8)
SODIUM SERPL-SCNC: 133 MMOL/L (ref 136–145)
WBC NRBC COR # BLD AUTO: 8.81 10*3/MM3 (ref 3.4–10.8)

## 2024-02-15 PROCEDURE — 97110 THERAPEUTIC EXERCISES: CPT

## 2024-02-15 PROCEDURE — 93010 ELECTROCARDIOGRAM REPORT: CPT | Performed by: INTERNAL MEDICINE

## 2024-02-15 PROCEDURE — 71045 X-RAY EXAM CHEST 1 VIEW: CPT

## 2024-02-15 PROCEDURE — 80048 BASIC METABOLIC PNL TOTAL CA: CPT | Performed by: NURSE PRACTITIONER

## 2024-02-15 PROCEDURE — 84132 ASSAY OF SERUM POTASSIUM: CPT | Performed by: THORACIC SURGERY (CARDIOTHORACIC VASCULAR SURGERY)

## 2024-02-15 PROCEDURE — 93005 ELECTROCARDIOGRAM TRACING: CPT | Performed by: NURSE PRACTITIONER

## 2024-02-15 PROCEDURE — 25010000002 ENOXAPARIN PER 10 MG: Performed by: NURSE PRACTITIONER

## 2024-02-15 PROCEDURE — 99024 POSTOP FOLLOW-UP VISIT: CPT | Performed by: THORACIC SURGERY (CARDIOTHORACIC VASCULAR SURGERY)

## 2024-02-15 PROCEDURE — 25010000002 CEFAZOLIN IN DEXTROSE 2-4 GM/100ML-% SOLUTION: Performed by: NURSE PRACTITIONER

## 2024-02-15 PROCEDURE — 85027 COMPLETE CBC AUTOMATED: CPT | Performed by: NURSE PRACTITIONER

## 2024-02-15 PROCEDURE — 82948 REAGENT STRIP/BLOOD GLUCOSE: CPT

## 2024-02-15 RX ORDER — POTASSIUM CHLORIDE 750 MG/1
20 TABLET, FILM COATED, EXTENDED RELEASE ORAL ONCE
Status: COMPLETED | OUTPATIENT
Start: 2024-02-15 | End: 2024-02-15

## 2024-02-15 RX ORDER — FUROSEMIDE 40 MG/1
40 TABLET ORAL DAILY
Status: DISCONTINUED | OUTPATIENT
Start: 2024-02-15 | End: 2024-02-16

## 2024-02-15 RX ORDER — POTASSIUM CHLORIDE 750 MG/1
20 TABLET, FILM COATED, EXTENDED RELEASE ORAL DAILY
Status: DISCONTINUED | OUTPATIENT
Start: 2024-02-15 | End: 2024-02-17 | Stop reason: HOSPADM

## 2024-02-15 RX ORDER — POLYETHYLENE GLYCOL 3350 17 G/17G
17 POWDER, FOR SOLUTION ORAL DAILY
Status: DISCONTINUED | OUTPATIENT
Start: 2024-02-15 | End: 2024-02-17 | Stop reason: HOSPADM

## 2024-02-15 RX ADMIN — HYDROCODONE BITARTRATE AND ACETAMINOPHEN 2 TABLET: 5; 325 TABLET ORAL at 04:34

## 2024-02-15 RX ADMIN — POTASSIUM CHLORIDE 20 MEQ: 750 TABLET, EXTENDED RELEASE ORAL at 08:23

## 2024-02-15 RX ADMIN — METOPROLOL TARTRATE 25 MG: 25 TABLET, FILM COATED ORAL at 21:58

## 2024-02-15 RX ADMIN — 0.12% CHLORHEXIDINE GLUCONATE 15 ML: 1.2 RINSE ORAL at 15:56

## 2024-02-15 RX ADMIN — 0.12% CHLORHEXIDINE GLUCONATE 15 ML: 1.2 RINSE ORAL at 04:34

## 2024-02-15 RX ADMIN — ALPRAZOLAM 0.25 MG: 0.25 TABLET ORAL at 00:56

## 2024-02-15 RX ADMIN — CYCLOBENZAPRINE 10 MG: 10 TABLET, FILM COATED ORAL at 00:56

## 2024-02-15 RX ADMIN — BISACODYL 10 MG: 5 TABLET, COATED ORAL at 21:57

## 2024-02-15 RX ADMIN — ASPIRIN 81 MG: 81 TABLET, COATED ORAL at 08:23

## 2024-02-15 RX ADMIN — GUAIFENESIN 1200 MG: 600 TABLET, EXTENDED RELEASE ORAL at 21:57

## 2024-02-15 RX ADMIN — ENOXAPARIN SODIUM 40 MG: 100 INJECTION SUBCUTANEOUS at 21:57

## 2024-02-15 RX ADMIN — HYDROCODONE BITARTRATE AND ACETAMINOPHEN 2 TABLET: 5; 325 TABLET ORAL at 00:18

## 2024-02-15 RX ADMIN — ATORVASTATIN CALCIUM 40 MG: 20 TABLET, FILM COATED ORAL at 21:57

## 2024-02-15 RX ADMIN — DOCUSATE SODIUM 50MG AND SENNOSIDES 8.6MG 2 TABLET: 8.6; 5 TABLET, FILM COATED ORAL at 21:57

## 2024-02-15 RX ADMIN — METOPROLOL TARTRATE 25 MG: 25 TABLET, FILM COATED ORAL at 08:23

## 2024-02-15 RX ADMIN — MUPIROCIN 1 APPLICATION: 20 OINTMENT TOPICAL at 21:57

## 2024-02-15 RX ADMIN — POLYETHYLENE GLYCOL 3350 17 G: 17 POWDER, FOR SOLUTION ORAL at 08:23

## 2024-02-15 RX ADMIN — HYDROCODONE BITARTRATE AND ACETAMINOPHEN 2 TABLET: 5; 325 TABLET ORAL at 10:26

## 2024-02-15 RX ADMIN — HYDROCODONE BITARTRATE AND ACETAMINOPHEN 2 TABLET: 5; 325 TABLET ORAL at 21:58

## 2024-02-15 RX ADMIN — MUPIROCIN 1 APPLICATION: 20 OINTMENT TOPICAL at 08:25

## 2024-02-15 RX ADMIN — PANTOPRAZOLE SODIUM 40 MG: 40 TABLET, DELAYED RELEASE ORAL at 06:06

## 2024-02-15 RX ADMIN — POTASSIUM CHLORIDE 20 MEQ: 750 TABLET, EXTENDED RELEASE ORAL at 06:05

## 2024-02-15 RX ADMIN — FUROSEMIDE 40 MG: 40 TABLET ORAL at 08:23

## 2024-02-15 RX ADMIN — CEFAZOLIN SODIUM 2000 MG: 2 INJECTION, SOLUTION INTRAVENOUS at 06:05

## 2024-02-15 RX ADMIN — GUAIFENESIN 1200 MG: 600 TABLET, EXTENDED RELEASE ORAL at 08:29

## 2024-02-15 NOTE — PROGRESS NOTES
" LOS: 2 days   Patient Care Team:  George, Alexander Bancroft, MD as PCP - General (Internal Medicine)  MAVIS Hwang MD as Consulting Physician (Cardiology)  Alessio Patino MD as Consulting Physician (Cardiology)    Chief Complaint: post op    Subjective:  Symptoms:  No shortness of breath, cough or chest pain.    Diet:  Adequate intake.  No nausea or vomiting.    Activity level: Returning to normal.    Pain:  He complains of pain that is mild.  Pain is well controlled.      Vital Signs  Temp:  [98 °F (36.7 °C)-99.5 °F (37.5 °C)] 98.8 °F (37.1 °C)  Heart Rate:  [78-95] 81  Resp:  [18-22] 20  BP: ()/(65-78) 113/77  Body mass index is 27.17 kg/m².    Intake/Output Summary (Last 24 hours) at 2/15/2024 0723  Last data filed at 2/15/2024 0605  Gross per 24 hour   Intake 1405 ml   Output 2205 ml   Net -800 ml     No intake/output data recorded.    Chest tube drainage last 8 hours: 30        02/14/24  0600 02/15/24  0020 02/15/24  0600   Weight: 90.9 kg (200 lb 6.4 oz) 90.9 kg (200 lb 6.4 oz) 90.9 kg (200 lb 6.4 oz)     Objective:  General Appearance:  Comfortable and in no acute distress.    Vital signs: (most recent): Blood pressure 124/89, pulse 86, temperature 98.8 °F (37.1 °C), temperature source Oral, resp. rate 20, height 182.9 cm (72.01\"), weight 90.9 kg (200 lb 6.4 oz), SpO2 95%.  Vital signs are normal.  No fever.    Output: Producing urine.    Lungs:  Normal effort and normal respiratory rate.  There are decreased breath sounds.    Heart: Normal rate.  Regular rhythm.  (SR on tele monitor)  Abdomen: Abdomen is soft.  Bowel sounds are normal.     Extremities: There is no dependent edema.    Pulses: Distal pulses are intact.    Neurological: Patient is alert and oriented to person, place and time.    Skin:  Warm and dry.  (Sternal dressing clean, dry, and intact)          Results Review:        WBC WBC   Date Value Ref Range Status   02/15/2024 8.81 3.40 - 10.80 10*3/mm3 Final   02/14/2024 " 6.02 3.40 - 10.80 10*3/mm3 Final   02/13/2024 8.54 3.40 - 10.80 10*3/mm3 Final   02/13/2024 10.62 3.40 - 10.80 10*3/mm3 Final   02/13/2024 9.16 3.40 - 10.80 10*3/mm3 Final      HGB Hemoglobin   Date Value Ref Range Status   02/15/2024 11.7 (L) 13.0 - 17.7 g/dL Final   02/14/2024 11.4 (L) 13.0 - 17.7 g/dL Final   02/13/2024 13.0 13.0 - 17.7 g/dL Final   02/13/2024 13.3 13.0 - 17.7 g/dL Final   02/13/2024 11.2 (L) 12.0 - 17.0 g/dL Final   02/13/2024 11.7 (L) 13.0 - 17.7 g/dL Final   02/13/2024 11.9 (L) 12.0 - 17.0 g/dL Final   02/13/2024 11.2 (L) 12.0 - 17.0 g/dL Final   02/13/2024 10.9 (L) 12.0 - 17.0 g/dL Final   02/13/2024 13.3 12.0 - 17.0 g/dL Final      HCT Hematocrit   Date Value Ref Range Status   02/15/2024 34.4 (L) 37.5 - 51.0 % Final   02/14/2024 34.5 (L) 37.5 - 51.0 % Final   02/13/2024 37.4 (L) 37.5 - 51.0 % Final   02/13/2024 39.2 37.5 - 51.0 % Final   02/13/2024 33 (L) 38 - 51 % Final   02/13/2024 34.4 (L) 37.5 - 51.0 % Final   02/13/2024 35 (L) 38 - 51 % Final   02/13/2024 33 (L) 38 - 51 % Final   02/13/2024 32 (L) 38 - 51 % Final   02/13/2024 39 38 - 51 % Final      Platelets Platelets   Date Value Ref Range Status   02/15/2024 113 (L) 140 - 450 10*3/mm3 Final   02/14/2024 102 (L) 140 - 450 10*3/mm3 Final   02/13/2024 121 (L) 140 - 450 10*3/mm3 Final   02/13/2024 144 140 - 450 10*3/mm3 Final   02/13/2024 121 (L) 140 - 450 10*3/mm3 Final        PT/INR:    Protime   Date Value Ref Range Status   02/14/2024 16.5 (H) 11.7 - 14.2 Seconds Final   02/13/2024 17.3 (H) 11.7 - 14.2 Seconds Final   02/13/2024 16.2 (H) 12.8 - 15.2 seconds Final     Comment:     Serial Number: [id number]:  [id number]   02/13/2024 18.9 (H) 11.7 - 14.2 Seconds Final   /  INR   Date Value Ref Range Status   02/14/2024 1.32 (H) 0.90 - 1.10 Final   02/13/2024 1.39 (H) 0.90 - 1.10 Final   02/13/2024 1.4 (H) 0.8 - 1.2 Final   02/13/2024 1.56 (H) 0.90 - 1.10 Final       Sodium Sodium   Date Value Ref Range Status   02/15/2024 133 (L) 136 -  145 mmol/L Final   02/14/2024 139 136 - 145 mmol/L Final   02/13/2024 140 136 - 145 mmol/L Final   02/13/2024 137 136 - 145 mmol/L Final      Potassium Potassium   Date Value Ref Range Status   02/15/2024 3.7 3.5 - 5.2 mmol/L Final   02/14/2024 4.3 3.5 - 5.2 mmol/L Final   02/13/2024 4.0 3.5 - 5.2 mmol/L Final   02/13/2024 4.6 3.5 - 5.2 mmol/L Final     Comment:     Slight hemolysis detected by analyzer. Result may be falsely elevated.      Chloride Chloride   Date Value Ref Range Status   02/15/2024 100 98 - 107 mmol/L Final   02/14/2024 107 98 - 107 mmol/L Final   02/13/2024 108 (H) 98 - 107 mmol/L Final   02/13/2024 105 98 - 107 mmol/L Final      Bicarbonate CO2   Date Value Ref Range Status   02/15/2024 23.5 22.0 - 29.0 mmol/L Final   02/14/2024 20.9 (L) 22.0 - 29.0 mmol/L Final   02/13/2024 19.8 (L) 22.0 - 29.0 mmol/L Final   02/13/2024 22.1 22.0 - 29.0 mmol/L Final      BUN BUN   Date Value Ref Range Status   02/15/2024 12 8 - 23 mg/dL Final   02/14/2024 14 8 - 23 mg/dL Final   02/13/2024 14 8 - 23 mg/dL Final   02/13/2024 13 8 - 23 mg/dL Final      Creatinine Creatinine   Date Value Ref Range Status   02/15/2024 0.94 0.76 - 1.27 mg/dL Final   02/14/2024 1.07 0.76 - 1.27 mg/dL Final   02/13/2024 1.07 0.76 - 1.27 mg/dL Final   02/13/2024 1.05 0.76 - 1.27 mg/dL Final      Calcium Calcium   Date Value Ref Range Status   02/15/2024 8.6 8.6 - 10.5 mg/dL Final   02/14/2024 8.3 (L) 8.6 - 10.5 mg/dL Final   02/13/2024 8.2 (L) 8.6 - 10.5 mg/dL Final   02/13/2024 8.6 8.6 - 10.5 mg/dL Final      Magnesium Magnesium   Date Value Ref Range Status   02/14/2024 2.9 (H) 1.6 - 2.4 mg/dL Final   02/13/2024 2.2 1.6 - 2.4 mg/dL Final   02/13/2024 2.9 (H) 1.6 - 2.4 mg/dL Final          aspirin, 81 mg, Oral, Daily  atorvastatin, 40 mg, Oral, Nightly  chlorhexidine, 15 mL, Mouth/Throat, Q12H  enoxaparin, 40 mg, Subcutaneous, Daily  guaiFENesin, 1,200 mg, Oral, Q12H  insulin lispro, 2-7 Units, Subcutaneous, 4x Daily AC & at  Bedtime  metoprolol tartrate, 12.5 mg, Oral, Q12H  mupirocin, , Each Nare, BID  pantoprazole, 40 mg, Intravenous, Once   Followed by  pantoprazole, 40 mg, Oral, QAM  senna-docusate sodium, 2 tablet, Oral, Nightly      clevidipine, 2-32 mg/hr  norepinephrine, 0.02-0.2 mcg/kg/min, Last Rate: Stopped (02/14/24 0750)  sodium chloride, 30 mL/hr, Last Rate: 30 mL/hr (02/13/24 1607)        Assessment & Plan  - severe aortic stenosis, s/p AVR with mini J sternotomy--POD#2 Pagni  - hyperlipidemia  - post op anemia, expected ABL    Looks good this morning, up in the chair  Remains in SR. Rates trending up into the 90s. Increase beta blocker  On 1-2L NC--wean as able  Transition to oral diuretics  Mobilize/aggressive pulmonary toilet--continue IS/mucinex  Increase bowel regimen  Discontinue central line, jalloh catheter, and chest tube  Continue routine care    Saundra Mcgrath, ERIN  02/15/24  07:23 EST

## 2024-02-15 NOTE — PLAN OF CARE
Goal Outcome Evaluation:  Plan of Care Reviewed With: patient, spouse        Progress: improving  Outcome Evaluation: Pt able to walk farther with less assist today, overall good progress with mobility, pt amb 200 ft with min/CGA, mild SOA with activity, PT will cont to progress as tolerated      Anticipated Discharge Disposition (PT): home with assist

## 2024-02-15 NOTE — PLAN OF CARE
Goal Outcome Evaluation:  Plan of Care Reviewed With: patient, spouse        Progress: improving  Outcome Evaluation: Pt s/p AV Replacement, progressing well post-operatively. VSS, pain treated per MAR, encouraging pulmonary toiletting with IS, OPEP ordered, which seemed to help with low grade fever of 99.5. Current temp improved to 98.8. Pt has family at bedside, CT & Childers catheter in place, output documented in flowsheet. Encouraging oral intake, responding well. Lab results reviewed, Electrolyte replacement initiated per protocol. No acute complaints at this time. Will continue to monitor

## 2024-02-15 NOTE — THERAPY TREATMENT NOTE
Patient Name: Timo Aceves  : 1955    MRN: [id number]                              Today's Date: 2/15/2024       Admit Date: 2024    Visit Dx:     ICD-10-CM ICD-9-CM   1. Aortic stenosis, severe  I35.0 424.1     Patient Active Problem List   Diagnosis    Aortic stenosis, severe    Medial meniscus, posterior horn derangement, right    Complex tear of medial meniscus of right knee as current injury    Aortic stenosis     Past Medical History:   Diagnosis Date    Arthritis     Coronary artery disease     AORTIC STENOSIS    Elevated cholesterol     Heart murmur     Hyperlipidemia      Past Surgical History:   Procedure Laterality Date    AORTIC VALVE REPAIR/REPLACEMENT N/A 2024    Procedure: AORTIC VALVE REPLACEMENT with mini-sternotomy TRANSESOPHAGEAL ECHOCARDIOGRAM WITH ANESTHESIA AND PRP;  Surgeon: Jordan Vincent MD;  [facility];  Service: Cardiothoracic;  Laterality: N/A;    CARDIAC CATHETERIZATION N/A 2023    Procedure: Coronaries only;  Surgeon: MAVIS Hwang MD;  [facility]: [facility];  Service: Cardiology;  Laterality: N/A;    ORIF CLAVICLE FRACTURE Right       General Information       Row Name 02/15/24 1039          Physical Therapy Time and Intention    Document Type therapy note (daily note)  -PC     Mode of Treatment physical therapy  -PC       Row Name 02/15/24 1039          General Information    Existing Precautions/Restrictions cardiac;fall;sternal  -PC       Row Name 02/15/24 1039          Cognition    Orientation Status (Cognition) oriented x 4  -PC               User Key  (r) = Recorded By, (t) = Taken By, (c) = Cosigned By      Initials Name Provider Type    PC Grace Brock PT Physical Therapist                   Mobility       Row Name 02/15/24 1039          Bed Mobility    Bed Mobility sit-supine  -PC     Sit-Supine [place] (Bed Mobility) minimum assist (75% patient effort);2 person assist  -PC       Row Name 02/15/24 1039           Sit-Stand Transfer    Sit-Stand [place] (Transfers) minimum assist (75% patient effort);contact guard  -PC       Row Name 02/15/24 1039          Gait/Stairs (Locomotion)    [place] Level (Gait) minimum assist (75% patient effort);contact guard  -PC     Distance in Feet (Gait) 200 ft  -PC               User Key  (r) = Recorded By, (t) = Taken By, (c) = Cosigned By      Initials Name Provider Type    PC Grace Brock, PT Physical Therapist                   Obj/Interventions       Row Name 02/15/24 1040          Motor Skills    Therapeutic Exercise --  5 reps cardiac protocol  -PC               User Key  (r) = Recorded By, (t) = Taken By, (c) = Cosigned By      Initials Name Provider Type    PC Grace Brock, PT Physical Therapist                   Goals/Plan    No documentation.                  Clinical Impression       Row Name 02/15/24 1040          Pain    Pre/Posttreatment Pain Comment expected postop pain, being addressed by nursing  -PC       Row Name 02/15/24 1040          Plan of Care Review    Plan of Care Reviewed With patient;spouse  -PC     Progress improving  -PC     Outcome Evaluation Pt able to walk farther with less assist today, overall good progress with mobility, pt amb 200 ft with min/CGA, mild SOA with activity, PT will cont to progress as tolerated  -PC       Row Name 02/15/24 1040          Therapy Assessment/Plan (PT)    Therapy Frequency (PT) 6 times/wk  -PC       Row Name 02/15/24 1040          Positioning and Restraints    Pre-Treatment Position sitting in chair/recliner  -PC     Post Treatment Position bed  -PC     In Bed supine;call light within reach;encouraged to call for assist;with family/caregiver  -PC               User Key  (r) = Recorded By, (t) = Taken By, (c) = Cosigned By      Initials Name Provider Type    PC Grace Brock, PT Physical Therapist                   Outcome Measures       Row Name 02/15/24 1042 02/15/24 0820       How much help from another  person do you currently need...    Turning from your back to your side while in flat bed without using bedrails? 3  -PC 3  -AC (r) MB (t) AC (c)    Moving from lying on back to sitting on the side of a flat bed without bedrails? 3  -PC 3  -AC (r) MB (t) AC (c)    Moving to and from a bed to a chair (including a wheelchair)? 3  -PC 3  -AC (r) MB (t) AC (c)    Standing up from a chair using your arms (e.g., wheelchair, bedside chair)? 3  -PC 3  -AC (r) MB (t) AC (c)    Climbing 3-5 steps with a railing? 2  -PC 2  -AC (r) MB (t) AC (c)    To walk in hospital room? 3  -PC 3  -AC (r) MB (t) AC (c)    AM-PAC 6 Clicks Score (PT) 17  -PC 17  -AC (r) MB (t)    Highest Level of Mobility Goal 5 --> Static standing  -PC 5 --> Static standing  -AC (r) MB (t)              User Key  (r) = Recorded By, (t) = Taken By, (c) = Cosigned By      Initials Name Provider Type    PC Grace Brock, PT Physical Therapist    Tessa Pleitez, RN Registered Nurse    Lisa Iraheta, RN Extern Registered Nurse                                 Physical Therapy Education       Title: PT OT SLP Therapies (Done)       Topic: Physical Therapy (Done)       Point: Mobility training (Done)       Learning Progress Summary             Patient Acceptance, E,D, DU by  at 2/14/2024 1018                         Point: Home exercise program (Done)       Learning Progress Summary             Patient Acceptance, E,D, DU by  at 2/14/2024 1018                         Point: Body mechanics (Done)       Learning Progress Summary             Patient Acceptance, E,D, DU by  at 2/14/2024 1018                         Point: Precautions (Done)       Learning Progress Summary             Patient Acceptance, E,D, DU by  at 2/14/2024 1018                                         User Key       Initials Effective Dates Name Provider Type Discipline     06/16/21 -  Grace Brock, PT Physical Therapist PT                  PT Recommendation and Plan  Planned  Therapy Interventions (PT): bed mobility training, gait training, transfer training, strengthening  Plan of Care Reviewed With: patient, spouse  Progress: improving  Outcome Evaluation: Pt able to walk farther with less assist today, overall good progress with mobility, pt amb 200 ft with min/CGA, mild SOA with activity, PT will cont to progress as tolerated     Time Calculation:         PT Charges       Row Name 02/15/24 1042             Time Calculation    Start Time 0926  -PC      Stop Time 0939  -PC      Time Calculation (min) 13 min  -PC      PT Received On 02/15/24  -PC      PT - Next Appointment 02/16/24  -PC                User Key  (r) = Recorded By, (t) = Taken By, (c) = Cosigned By      Initials Name Provider Type    PC Grace Brock, PT Physical Therapist                  Therapy Charges for Today       Code Description Service Date Service Provider Modifiers Qty    47286991041  PT EVAL MOD COMPLEXITY 2 2/14/2024 Grace Brock, PT GP 1    29473677786 HC PT THER PROC EA 15 MIN 2/14/2024 Grace Brock, PT GP 1    07327040130 HC PT THER PROC EA 15 MIN 2/15/2024 Grace Brock, PT GP 1            PT G-Codes  AM-PAC 6 Clicks Score (PT): 17  PT Discharge Summary  Anticipated Discharge Disposition (PT): home with assist    Grace Brock PT  2/15/2024

## 2024-02-15 NOTE — DISCHARGE PLACEMENT REQUEST
"KetanTimo damon P \"MARCELINO\" (68 y.o. Male)       Date of Birth   1955    Social Security Number       Address   [address] [facility] [address]    Home Phone   [phone]    MRN   [id number]       Confucianist   Jew    Marital Status                               Admission Date   2/13/24    Admission Type   Elective    Admitting Provider   Jordan Vincent MD    Attending Provider   Jordan Vincent MD    Department, Room/Bed   [facility] CARDIOVASC UNIT, 2212/1       Discharge Date       Discharge Disposition       Discharge Destination                                 Attending Provider: Jordan Vincent MD    Allergies: No Known Allergies    Isolation: None   Infection: None   Code Status: CPR    Ht: 182.9 cm (72.01\")   Wt: 90.9 kg (200 lb 6.4 oz)    Admission Cmt: None   Principal Problem: Aortic stenosis, severe [I35.0]                   Active Insurance as of 2/13/2024       Primary Coverage       Payor Plan Insurance Group Employer/Plan Group    HU[employer] MEDICARE REPLACEMENT HU[employer]NA MED ADV PPO [id number]       Payor Plan Address Payor Plan Phone Number Payor Plan Fax Number Effective Dates    [address] [address] [phone]  1/1/2022 - None Entered    [facility] [id number]         Subscriber Name Subscriber Birth Date Member ID       TIMO MARAVILLA ROBERT 1955 [id number]                     Emergency Contacts        (Rel.) Home Phone Work Phone Mobile Phone    Bhavani Maravilla (Spouse) [phone] -- [phone]    Alondra Limon (Daughter) [phone] -- [phone]                "

## 2024-02-16 ENCOUNTER — APPOINTMENT (OUTPATIENT)
Dept: GENERAL RADIOLOGY | Facility: HOSPITAL | Age: 69
End: 2024-02-16
Payer: MEDICARE

## 2024-02-16 LAB
ANION GAP SERPL CALCULATED.3IONS-SCNC: 9 MMOL/L (ref 5–15)
BUN SERPL-MCNC: 14 MG/DL (ref 8–23)
BUN/CREAT SERPL: 16.7 (ref 7–25)
CALCIUM SPEC-SCNC: 8.7 MG/DL (ref 8.6–10.5)
CHLORIDE SERPL-SCNC: 99 MMOL/L (ref 98–107)
CO2 SERPL-SCNC: 25 MMOL/L (ref 22–29)
CREAT SERPL-MCNC: 0.84 MG/DL (ref 0.76–1.27)
DEPRECATED RDW RBC AUTO: 41.9 FL (ref 37–54)
EGFRCR SERPLBLD CKD-EPI 2021: 93.8 ML/MIN/1.73
ERYTHROCYTE [DISTWIDTH] IN BLOOD BY AUTOMATED COUNT: 13 % (ref 12.3–15.4)
GLUCOSE SERPL-MCNC: 87 MG/DL (ref 65–99)
HCT VFR BLD AUTO: 35.7 % (ref 37.5–51)
HGB BLD-MCNC: 11.8 G/DL (ref 13–17.7)
MCH RBC QN AUTO: 29.7 PG (ref 26.6–33)
MCHC RBC AUTO-ENTMCNC: 33.1 G/DL (ref 31.5–35.7)
MCV RBC AUTO: 89.9 FL (ref 79–97)
PLATELET # BLD AUTO: 115 10*3/MM3 (ref 140–450)
PMV BLD AUTO: 10.2 FL (ref 6–12)
POTASSIUM SERPL-SCNC: 4 MMOL/L (ref 3.5–5.2)
RBC # BLD AUTO: 3.97 10*6/MM3 (ref 4.14–5.8)
SODIUM SERPL-SCNC: 133 MMOL/L (ref 136–145)
WBC NRBC COR # BLD AUTO: 8.12 10*3/MM3 (ref 3.4–10.8)

## 2024-02-16 PROCEDURE — 94761 N-INVAS EAR/PLS OXIMETRY MLT: CPT

## 2024-02-16 PROCEDURE — 25010000002 ENOXAPARIN PER 10 MG: Performed by: NURSE PRACTITIONER

## 2024-02-16 PROCEDURE — 85027 COMPLETE CBC AUTOMATED: CPT | Performed by: NURSE PRACTITIONER

## 2024-02-16 PROCEDURE — 99024 POSTOP FOLLOW-UP VISIT: CPT | Performed by: THORACIC SURGERY (CARDIOTHORACIC VASCULAR SURGERY)

## 2024-02-16 PROCEDURE — 97530 THERAPEUTIC ACTIVITIES: CPT

## 2024-02-16 PROCEDURE — 25010000002 CALCIUM GLUCONATE-NACL 1-0.675 GM/50ML-% SOLUTION: Performed by: NURSE PRACTITIONER

## 2024-02-16 PROCEDURE — 94799 UNLISTED PULMONARY SVC/PX: CPT

## 2024-02-16 PROCEDURE — 80048 BASIC METABOLIC PNL TOTAL CA: CPT | Performed by: NURSE PRACTITIONER

## 2024-02-16 PROCEDURE — 94760 N-INVAS EAR/PLS OXIMETRY 1: CPT

## 2024-02-16 PROCEDURE — 71046 X-RAY EXAM CHEST 2 VIEWS: CPT

## 2024-02-16 PROCEDURE — 94762 N-INVAS EAR/PLS OXIMTRY CONT: CPT

## 2024-02-16 RX ORDER — FUROSEMIDE 20 MG/1
20 TABLET ORAL DAILY
Status: DISCONTINUED | OUTPATIENT
Start: 2024-02-17 | End: 2024-02-17 | Stop reason: HOSPADM

## 2024-02-16 RX ORDER — CALCIUM GLUCONATE 20 MG/ML
1000 INJECTION, SOLUTION INTRAVENOUS ONCE
Status: COMPLETED | OUTPATIENT
Start: 2024-02-16 | End: 2024-02-16

## 2024-02-16 RX ADMIN — GUAIFENESIN 1200 MG: 600 TABLET, EXTENDED RELEASE ORAL at 21:07

## 2024-02-16 RX ADMIN — FUROSEMIDE 40 MG: 40 TABLET ORAL at 09:27

## 2024-02-16 RX ADMIN — HYDROCODONE BITARTRATE AND ACETAMINOPHEN 2 TABLET: 5; 325 TABLET ORAL at 21:17

## 2024-02-16 RX ADMIN — 0.12% CHLORHEXIDINE GLUCONATE 15 ML: 1.2 RINSE ORAL at 07:07

## 2024-02-16 RX ADMIN — PANTOPRAZOLE SODIUM 40 MG: 40 TABLET, DELAYED RELEASE ORAL at 07:07

## 2024-02-16 RX ADMIN — METOPROLOL TARTRATE 12.5 MG: 25 TABLET, FILM COATED ORAL at 14:53

## 2024-02-16 RX ADMIN — ENOXAPARIN SODIUM 40 MG: 100 INJECTION SUBCUTANEOUS at 21:06

## 2024-02-16 RX ADMIN — METOPROLOL TARTRATE 37.5 MG: 25 TABLET, FILM COATED ORAL at 21:06

## 2024-02-16 RX ADMIN — CALCIUM GLUCONATE 1000 MG: 20 INJECTION, SOLUTION INTRAVENOUS at 09:27

## 2024-02-16 RX ADMIN — ASPIRIN 81 MG: 81 TABLET, COATED ORAL at 09:27

## 2024-02-16 RX ADMIN — ATORVASTATIN CALCIUM 40 MG: 20 TABLET, FILM COATED ORAL at 21:06

## 2024-02-16 RX ADMIN — MUPIROCIN 1 APPLICATION: 20 OINTMENT TOPICAL at 09:27

## 2024-02-16 RX ADMIN — POTASSIUM CHLORIDE 20 MEQ: 750 TABLET, EXTENDED RELEASE ORAL at 09:27

## 2024-02-16 RX ADMIN — METOPROLOL TARTRATE 25 MG: 25 TABLET, FILM COATED ORAL at 09:27

## 2024-02-16 RX ADMIN — GUAIFENESIN 1200 MG: 600 TABLET, EXTENDED RELEASE ORAL at 09:27

## 2024-02-16 RX ADMIN — MUPIROCIN 1 APPLICATION: 20 OINTMENT TOPICAL at 21:07

## 2024-02-16 NOTE — CONSULTS
Met with patient to discuss the benefits of cardiac rehab. Provided phase II information along with the contact information for cardiac rehab at [facility] . Requested for referral to be sent. Explained if receiving home health would not be able to attend cardiac rehab until finished with home health.

## 2024-02-16 NOTE — PROGRESS NOTES
" LOS: 3 days   Patient Care Team:  George, Alexander Bancroft, MD as PCP - General (Internal Medicine)  MAVIS Hwang MD as Consulting Physician (Cardiology)  Alessio Patino MD as Consulting Physician (Cardiology)    Chief Complaint: post op    Subjective:  Symptoms:  No shortness of breath, cough or chest pain.    Diet:  Adequate intake.  No nausea or vomiting.    Activity level: Returning to normal.    Pain:  He complains of pain that is mild.  Pain is well controlled.      Vital Signs  Temp:  [97.9 °F (36.6 °C)-99 °F (37.2 °C)] 97.9 °F (36.6 °C)  Heart Rate:  [] 92  Resp:  [16-20] 16  BP: ()/(73-89) 120/82  Body mass index is 26.96 kg/m².    Intake/Output Summary (Last 24 hours) at 2/16/2024 0801  Last data filed at 2/16/2024 0640  Gross per 24 hour   Intake 1080 ml   Output 2220 ml   Net -1140 ml     No intake/output data recorded.          02/15/24  0020 02/15/24  0600 02/16/24  0700   Weight: 90.9 kg (200 lb 6.4 oz) 90.9 kg (200 lb 6.4 oz) 90.2 kg (198 lb 12.8 oz)     Objective:  General Appearance:  Comfortable and in no acute distress.    Vital signs: (most recent): Blood pressure 120/82, pulse 92, temperature 97.9 °F (36.6 °C), temperature source Oral, resp. rate 16, height 182.9 cm (72.01\"), weight 90.2 kg (198 lb 12.8 oz), SpO2 97%.  Vital signs are normal.  No fever.    Output: Producing urine and producing stool.    Lungs:  Normal effort and normal respiratory rate.  There are decreased breath sounds.    Heart: Normal rate.  Regular rhythm.  (SR on tele monitor)  Abdomen: Abdomen is soft.  Bowel sounds are normal.     Extremities: There is no dependent edema.    Pulses: Distal pulses are intact.    Neurological: Patient is alert and oriented to person, place and time.    Skin:  Warm and dry.  (Sternal dressing clean, dry, and intact)          Results Review:        WBC WBC   Date Value Ref Range Status   02/16/2024 8.12 3.40 - 10.80 10*3/mm3 Final   02/15/2024 8.81 3.40 - 10.80 " 10*3/mm3 Final   02/14/2024 6.02 3.40 - 10.80 10*3/mm3 Final   02/13/2024 8.54 3.40 - 10.80 10*3/mm3 Final   02/13/2024 10.62 3.40 - 10.80 10*3/mm3 Final   02/13/2024 9.16 3.40 - 10.80 10*3/mm3 Final      HGB Hemoglobin   Date Value Ref Range Status   02/16/2024 11.8 (L) 13.0 - 17.7 g/dL Final   02/15/2024 11.7 (L) 13.0 - 17.7 g/dL Final   02/14/2024 11.4 (L) 13.0 - 17.7 g/dL Final   02/13/2024 13.0 13.0 - 17.7 g/dL Final   02/13/2024 13.3 13.0 - 17.7 g/dL Final   02/13/2024 11.2 (L) 12.0 - 17.0 g/dL Final   02/13/2024 11.7 (L) 13.0 - 17.7 g/dL Final   02/13/2024 11.9 (L) 12.0 - 17.0 g/dL Final   02/13/2024 11.2 (L) 12.0 - 17.0 g/dL Final   02/13/2024 10.9 (L) 12.0 - 17.0 g/dL Final   02/13/2024 13.3 12.0 - 17.0 g/dL Final      HCT Hematocrit   Date Value Ref Range Status   02/16/2024 35.7 (L) 37.5 - 51.0 % Final   02/15/2024 34.4 (L) 37.5 - 51.0 % Final   02/14/2024 34.5 (L) 37.5 - 51.0 % Final   02/13/2024 37.4 (L) 37.5 - 51.0 % Final   02/13/2024 39.2 37.5 - 51.0 % Final   02/13/2024 33 (L) 38 - 51 % Final   02/13/2024 34.4 (L) 37.5 - 51.0 % Final   02/13/2024 35 (L) 38 - 51 % Final   02/13/2024 33 (L) 38 - 51 % Final   02/13/2024 32 (L) 38 - 51 % Final   02/13/2024 39 38 - 51 % Final      Platelets Platelets   Date Value Ref Range Status   02/16/2024 115 (L) 140 - 450 10*3/mm3 Final   02/15/2024 113 (L) 140 - 450 10*3/mm3 Final   02/14/2024 102 (L) 140 - 450 10*3/mm3 Final   02/13/2024 121 (L) 140 - 450 10*3/mm3 Final   02/13/2024 144 140 - 450 10*3/mm3 Final   02/13/2024 121 (L) 140 - 450 10*3/mm3 Final        PT/INR:    Protime   Date Value Ref Range Status   02/14/2024 16.5 (H) 11.7 - 14.2 Seconds Final   02/13/2024 17.3 (H) 11.7 - 14.2 Seconds Final   02/13/2024 16.2 (H) 12.8 - 15.2 seconds Final     Comment:     Serial Number: [id number]:  [id number]   02/13/2024 18.9 (H) 11.7 - 14.2 Seconds Final   /  INR   Date Value Ref Range Status   02/14/2024 1.32 (H) 0.90 - 1.10 Final   02/13/2024 1.39 (H) 0.90 - 1.10  Final   02/13/2024 1.4 (H) 0.8 - 1.2 Final   02/13/2024 1.56 (H) 0.90 - 1.10 Final       Sodium Sodium   Date Value Ref Range Status   02/16/2024 133 (L) 136 - 145 mmol/L Final   02/15/2024 133 (L) 136 - 145 mmol/L Final   02/14/2024 139 136 - 145 mmol/L Final   02/13/2024 140 136 - 145 mmol/L Final   02/13/2024 137 136 - 145 mmol/L Final      Potassium Potassium   Date Value Ref Range Status   02/16/2024 4.0 3.5 - 5.2 mmol/L Final   02/15/2024 4.1 3.5 - 5.2 mmol/L Final   02/15/2024 3.7 3.5 - 5.2 mmol/L Final   02/14/2024 4.3 3.5 - 5.2 mmol/L Final   02/13/2024 4.0 3.5 - 5.2 mmol/L Final   02/13/2024 4.6 3.5 - 5.2 mmol/L Final     Comment:     Slight hemolysis detected by analyzer. Result may be falsely elevated.      Chloride Chloride   Date Value Ref Range Status   02/16/2024 99 98 - 107 mmol/L Final   02/15/2024 100 98 - 107 mmol/L Final   02/14/2024 107 98 - 107 mmol/L Final   02/13/2024 108 (H) 98 - 107 mmol/L Final   02/13/2024 105 98 - 107 mmol/L Final      Bicarbonate CO2   Date Value Ref Range Status   02/16/2024 25.0 22.0 - 29.0 mmol/L Final   02/15/2024 23.5 22.0 - 29.0 mmol/L Final   02/14/2024 20.9 (L) 22.0 - 29.0 mmol/L Final   02/13/2024 19.8 (L) 22.0 - 29.0 mmol/L Final   02/13/2024 22.1 22.0 - 29.0 mmol/L Final      BUN BUN   Date Value Ref Range Status   02/16/2024 14 8 - 23 mg/dL Final   02/15/2024 12 8 - 23 mg/dL Final   02/14/2024 14 8 - 23 mg/dL Final   02/13/2024 14 8 - 23 mg/dL Final   02/13/2024 13 8 - 23 mg/dL Final      Creatinine Creatinine   Date Value Ref Range Status   02/16/2024 0.84 0.76 - 1.27 mg/dL Final   02/15/2024 0.94 0.76 - 1.27 mg/dL Final   02/14/2024 1.07 0.76 - 1.27 mg/dL Final   02/13/2024 1.07 0.76 - 1.27 mg/dL Final   02/13/2024 1.05 0.76 - 1.27 mg/dL Final      Calcium Calcium   Date Value Ref Range Status   02/16/2024 8.7 8.6 - 10.5 mg/dL Final   02/15/2024 8.6 8.6 - 10.5 mg/dL Final   02/14/2024 8.3 (L) 8.6 - 10.5 mg/dL Final   02/13/2024 8.2 (L) 8.6 - 10.5 mg/dL  Final   02/13/2024 8.6 8.6 - 10.5 mg/dL Final      Magnesium Magnesium   Date Value Ref Range Status   02/14/2024 2.9 (H) 1.6 - 2.4 mg/dL Final   02/13/2024 2.2 1.6 - 2.4 mg/dL Final   02/13/2024 2.9 (H) 1.6 - 2.4 mg/dL Final          aspirin, 81 mg, Oral, Daily  atorvastatin, 40 mg, Oral, Nightly  chlorhexidine, 15 mL, Mouth/Throat, Q12H  enoxaparin, 40 mg, Subcutaneous, Daily  furosemide, 40 mg, Oral, Daily  guaiFENesin, 1,200 mg, Oral, Q12H  metoprolol tartrate, 25 mg, Oral, Q12H  mupirocin, , Each Nare, BID  pantoprazole, 40 mg, Intravenous, Once   Followed by  pantoprazole, 40 mg, Oral, QAM  polyethylene glycol, 17 g, Oral, Daily  potassium chloride, 20 mEq, Oral, Daily  senna-docusate sodium, 2 tablet, Oral, Nightly      clevidipine, 2-32 mg/hr  norepinephrine, 0.02-0.2 mcg/kg/min, Last Rate: Stopped (02/14/24 0750)  sodium chloride, 30 mL/hr, Last Rate: 30 mL/hr (02/13/24 1607)        Assessment & Plan  - severe aortic stenosis, s/p AVR with mini J sternotomy--POD#3 Pagni  - hyperlipidemia  - post op anemia, expected ABL    Looks good this morning, up in the chair  Remains in SR. Blood pressure stable. Tolerating increase in beta blocker  Weaned to RA. Requiring oxygen with sleep, will check overnight tonight  Creatinine stable. Tolerating oral diuretics  Mobilize/aggressive pulmonary toilet--continue IS/mucinex  Discontinue AV wires  Anticipate discharge home with home health over the weekend pending progress  Continue routine care    ERIN Gutierrez  02/16/24  08:01 EST

## 2024-02-16 NOTE — PLAN OF CARE
Goal Outcome Evaluation:  Plan of Care Reviewed With: patient        Progress: improving  Outcome Evaluation: VSS. Chest tube, RIJ and Childers Catheter removed today. Epicardial pacemaker remains. Telemetry SR with 1st degree AVB.

## 2024-02-16 NOTE — PLAN OF CARE
Goal Outcome Evaluation:  Plan of Care Reviewed With: patient        Progress: improving  Outcome Evaluation: VSS. SR-ST on telemetry HR 's, Metoprolol increased today. 94-96% on room air, overnight oximetry tonight. Epicardial wire removed today.

## 2024-02-16 NOTE — CASE MANAGEMENT/SOCIAL WORK
Discharge Planning Assessment  [facility]     Patient Name: Timo Aceves  MRN: [id number]  Today's Date: 2/16/2024    Admit Date: 2/13/2024    Plan: Home w/ [facility].   Discharge Needs Assessment       Row Name 02/16/24 1728       Living Environment    People in Home spouse    Name(s) of People in Home Bhavani Aceves/wife    Current Living Arrangements home    Potentially Unsafe Housing Conditions none    In the past 12 months has the electric, gas, oil, or water company threatened to shut off services in your home? No    Primary Care Provided by self    Provides Primary Care For no one    Family Caregiver if Needed spouse    Family Caregiver Names Bhavani Swain    Quality of Family Relationships helpful;involved;supportive    Able to Return to Prior Arrangements yes       Resource/Environmental Concerns    Resource/Environmental Concerns none    Transportation Concerns none       Transition Planning    Patient/Family Anticipates Transition to home with family    Patient/Family Anticipated Services at Transition home health care    Transportation Anticipated family or friend will provide       Discharge Needs Assessment    Readmission Within the Last 30 Days no previous admission in last 30 days    Equipment Currently Used at Home scales    Concerns to be Addressed discharge planning    Anticipated Changes Related to Illness none    Equipment Needed After Discharge none    Discharge Facility/Level of Care Needs home with home health    Provided Post Acute Provider List? Yes    Post Acute Provider List Home Health    Patient's Choice of Community Agency(s) [facility]                   Discharge Plan       Row Name 02/16/24 1729       Plan    Plan Home w/ [facility].    Plan Comments CCP spoke with pleasant Pt at bedside.  CCP role explained and discharge planning discussed.  Face sheet verified.  Pt stated he is IADL's and drives.  Pt lives with spouse/Bhavani Aceves, in a single-story home with one  entrance stair step.  Pt reports PCP is, [facility].  Pt confirmed pharmacy is, [facility].  Pt denies use of past home health or going to a sub-acute rehab.  Pt has the following DME- scale.  Pt plans to return home at discharge with assistance of spouse.  [facility] consulted (with Pt permission), and [facility] when he discharges home.  CCP will continue to follow…….Ramona MENARD /RICKI.                  Continued Care and Services - Admitted Since 2/13/2024       Home Medical Care Coordination complete.      Service Provider Request Status Selected Services Address Phone Fax Patient Preferred    [facility] [place]  Selected Home Health Services [address] [place] [address] [phone] --                  Expected Discharge Date and Time       Expected Discharge Date Expected Discharge Time    Feb 19, 2024            Demographic Summary       Row Name 02/16/24 1727       General Information    Admission Type inpatient    Arrived From home    Required Notices Provided Important Message from Medicare    Referral Source admission list;physician    Reason for Consult discharge planning    Preferred Language English       Contact Information    Permission Granted to Share Info With family/designee                   Functional Status       Row Name 02/16/24 1728       Functional Status    Usual Activity Tolerance good    Current Activity Tolerance moderate       Functional Status, IADL    Medications independent    Meal Preparation independent    Housekeeping independent    Laundry independent    Shopping independent       Mental Status    General Appearance WDL WDL       Mental Status Summary    Recent Changes in Mental Status/Cognitive Functioning no changes       Employment/    Employment Status self-employed                   Psychosocial    No documentation.                  Abuse/Neglect    No documentation.                   Legal    No documentation.                  Substance Abuse    No documentation.                  Patient Forms    No documentation.                     Ramona Regalado RN

## 2024-02-16 NOTE — PLAN OF CARE
Goal Outcome Evaluation:  Plan of Care Reviewed With: patient        Progress: improving  Outcome Evaluation: Pt seated UIC at start of session and agreeable to work with PT. Pt was able to ambulate 200' with no AD and SBA. Pt performs cardiac protocol ther ex x 10. Safe to D/C home, PT will sign off at this time.

## 2024-02-16 NOTE — THERAPY TREATMENT NOTE
Patient Name: Timo Aceves  : 1955    MRN: [id number]                              Today's Date: 2024       Admit Date: 2024    Visit Dx:     ICD-10-CM ICD-9-CM   1. S/P AVR (aortic valve replacement)  Z95.2 V43.3   2. Aortic stenosis, severe  I35.0 424.1     Patient Active Problem List   Diagnosis    Aortic stenosis, severe    Medial meniscus, posterior horn derangement, right    Complex tear of medial meniscus of right knee as current injury    Aortic stenosis     Past Medical History:   Diagnosis Date    Arthritis     Coronary artery disease     AORTIC STENOSIS    Elevated cholesterol     Heart murmur     Hyperlipidemia      Past Surgical History:   Procedure Laterality Date    AORTIC VALVE REPAIR/REPLACEMENT N/A 2024    Procedure: AORTIC VALVE REPLACEMENT with mini-sternotomy TRANSESOPHAGEAL ECHOCARDIOGRAM WITH ANESTHESIA AND PRP;  Surgeon: Jordan Vincent MD;  Location: [facility] CVOR;  Service: Cardiothoracic;  Laterality: N/A;    CARDIAC CATHETERIZATION N/A 2023    Procedure: Coronaries only;  Surgeon: MAVIS Hwang MD;  Location: [facility] CATH INVASIVE LOCATION;  Service: Cardiology;  Laterality: N/A;    ORIF CLAVICLE FRACTURE Right       General Information       Row Name 24 1227          Physical Therapy Time and Intention    Document Type therapy note (daily note)  -DB     Mode of Treatment physical therapy;individual therapy  -DB       Row Name 24 1227          General Information    Patient Profile Reviewed yes  -DB               User Key  (r) = Recorded By, (t) = Taken By, (c) = Cosigned By      Initials Name Provider Type    DB Shannon Pierre PT Physical Therapist                   Mobility       Row Name 24 1228          Bed Mobility    Bed Mobility sit-supine  -DB     Sit-Supine [place] (Bed Mobility) standby assist  -DB       Row Name 24 1228          Sit-Stand Transfer    Sit-Stand [place] (Transfers) supervision  -DB       Row  Name 02/16/24 1228          Gait/Stairs (Locomotion)    [place] Level (Gait) standby assist  -DB     Distance in Feet (Gait) 200  -DB     Deviations/Abnormal Patterns (Gait) stride length decreased;gait speed decreased  -DB               User Key  (r) = Recorded By, (t) = Taken By, (c) = Cosigned By      Initials Name Provider Type    DB Shannon Pierre, PT Physical Therapist                   Obj/Interventions       Row Name 02/16/24 1231          Balance    Balance Assessment sitting static balance;sitting dynamic balance;standing static balance;standing dynamic balance  -DB     Static Sitting Balance independent  -DB     Dynamic Sitting Balance independent  -DB     Position, Sitting Balance sitting in chair  -DB     Static Standing Balance supervision  -DB     Dynamic Standing Balance standby assist  -DB     Position/Device Used, Standing Balance unsupported  -DB     Balance Interventions sitting;standing;sit to stand  -DB               User Key  (r) = Recorded By, (t) = Taken By, (c) = Cosigned By      Initials Name Provider Type    DB Shannon Pierre, PT Physical Therapist                   Goals/Plan    No documentation.                  Clinical Impression       Row Name 02/16/24 1232          Pain    Pain Intervention(s) Ambulation/increased activity;Repositioned  -DB       Row Name 02/16/24 1232          Plan of Care Review    Plan of Care Reviewed With patient  -DB     Progress improving  -DB     Outcome Evaluation Pt seated UIC at start of session and agreeable to work with PT. Pt was able to ambulate 200' with no AD and SBA. Pt performs cardiac protocol ther ex x 10. Safe to D/C home, PT will sign off at this time.  -DB       Row Name 02/16/24 1232          Vital Signs    O2 Delivery Pre Treatment room air  -DB     O2 Delivery Intra Treatment room air  -DB     O2 Delivery Post Treatment room air  -DB     Pre Patient Position Sitting  -DB     Intra Patient Position Standing  -DB     Post Patient  Position Supine  -DB       Row Name 02/16/24 1232          Positioning and Restraints    Pre-Treatment Position sitting in chair/recliner  -DB     Post Treatment Position bed  -DB     In Bed supine;call light within reach;encouraged to call for assist;with family/caregiver  -DB               User Key  (r) = Recorded By, (t) = Taken By, (c) = Cosigned By      Initials Name Provider Type    DB Shannon Pierre, PT Physical Therapist                   Outcome Measures       Row Name 02/16/24 1233 02/16/24 0745       How much help from another person do you currently need...    Turning from your back to your side while in flat bed without using bedrails? 4  -DB 3  -AC    Moving from lying on back to sitting on the side of a flat bed without bedrails? 4  -DB 3  -AC    Moving to and from a bed to a chair (including a wheelchair)? 4  -DB 3  -AC    Standing up from a chair using your arms (e.g., wheelchair, bedside chair)? 4  -DB 3  -AC    Climbing 3-5 steps with a railing? 3  -DB 3  -AC    To walk in hospital room? 4  -DB 3  -AC    AM-PAC 6 Clicks Score (PT) 23  -DB 18  -AC    Highest Level of Mobility Goal 7 --> Walk 25 feet or more  -DB 6 --> Walk 10 steps or more  -AC      Row Name 02/16/24 1233          Functional Assessment    Outcome Measure Options AM-PAC 6 Clicks Basic Mobility (PT)  -DB               User Key  (r) = Recorded By, (t) = Taken By, (c) = Cosigned By      Initials Name Provider Type    Tessa Pleitez, RN Registered Nurse    Shannon Raphael, PT Physical Therapist                                 Physical Therapy Education       Title: PT OT SLP Therapies (Done)       Topic: Physical Therapy (Done)       Point: Mobility training (Done)       Learning Progress Summary             Patient Acceptance, E, VU by DB at 2/16/2024 1234    Acceptance, E,D, DU by PC at 2/14/2024 1018                         Point: Home exercise program (Done)       Learning Progress Summary             Patient Acceptance,  E, VU by DB at 2/16/2024 1234    Acceptance, E,D, DU by PC at 2/14/2024 1018                         Point: Body mechanics (Done)       Learning Progress Summary             Patient Acceptance, E, VU by DB at 2/16/2024 1234    Acceptance, E,D, DU by PC at 2/14/2024 1018                         Point: Precautions (Done)       Learning Progress Summary             Patient Acceptance, E, VU by DB at 2/16/2024 1234    Acceptance, E,D, DU by PC at 2/14/2024 1018                                         User Key       Initials Effective Dates Name Provider Type Discipline    PC 06/16/21 -  Grace Brock PT Physical Therapist PT    DB 06/16/21 -  Shannon Pierre PT Physical Therapist PT                  PT Recommendation and Plan     Plan of Care Reviewed With: patient  Progress: improving  Outcome Evaluation: Pt seated UIC at start of session and agreeable to work with PT. Pt was able to ambulate 200' with no AD and SBA. Pt performs cardiac protocol ther ex x 10. Safe to D/C home, PT will sign off at this time.     Time Calculation:         PT Charges       Row Name 02/16/24 1234             Time Calculation    Start Time 1035  -DB      Stop Time 1045  -DB      Time Calculation (min) 10 min  -DB      PT Received On 02/16/24  -DB         Time Calculation- PT    Total Timed Code Minutes- PT 10 minute(s)  -DB                User Key  (r) = Recorded By, (t) = Taken By, (c) = Cosigned By      Initials Name Provider Type    DB Shannon Pierre, PT Physical Therapist                  Therapy Charges for Today       Code Description Service Date Service Provider Modifiers Qty    [id number]  PT THERAPEUTIC ACT EA 15 MIN 2/16/2024 Shannon Pierre PT GP 1            PT G-Codes  Outcome Measure Options: AM-PAC 6 Clicks Basic Mobility (PT)  AM-PAC 6 Clicks Score (PT): 23       Shannon Pierre PT  2/16/2024

## 2024-02-16 NOTE — DISCHARGE SUMMARY
Date of Admission: 2/13/2024  Date of Discharge:  2/17/2024    Discharge Diagnosis:   - severe aortic stenosis, s/p AVR with mini J sternotomy  - hyperlipidemia  - post op anemia, expected ABL    Presenting Problem/History of Present Illness  Aortic stenosis, severe [I35.0]  Aortic stenosis [I35.0]     Hospital Course  Patient is a 68 y.o. male who presented for previously scheduled cardiac surgery. On 2/13/24 he underwent AVR with mini J sternotomy with Dr. Vincent (see op note for full report). Post-operatively he did well. He was extubated on day of surgery, weaned from pressors, and transferred to stepdown on POD#1. His chest tubes, jalloh, central line, and epicardial wires were removed without issue. He was weaned from oxygen. Overnight oximetry with 2 minutes of desaturation below 89%. No oxygen at d/c. He has met PT goals. He is urinating and defecating without issue and eating and drinking sufficiently. On POD# 4 he was deemed ready for discharge home with home health. Sternal precautions reviewed    Procedures Performed  Procedure(s):  AORTIC VALVE REPLACEMENT with mini-sternotomy TRANSESOPHAGEAL ECHOCARDIOGRAM WITH ANESTHESIA AND PRP       Consults:   Consults       No orders found from 1/15/2024 to 2/14/2024.            Pertinent Test Results:    Lab Results   Component Value Date    WBC 6.08 02/17/2024    HGB 11.8 (L) 02/17/2024    HCT 35.8 (L) 02/17/2024    MCV 90.2 02/17/2024     02/17/2024      Lab Results   Component Value Date    GLUCOSE 94 02/17/2024    CALCIUM 8.4 (L) 02/17/2024     02/17/2024    K 3.7 02/17/2024    CO2 27.0 02/17/2024     02/17/2024    BUN 13 02/17/2024    CREATININE 0.97 02/17/2024    BCR 13.4 02/17/2024    ANIONGAP 9.0 02/17/2024     Lab Results   Component Value Date    INR 1.32 (H) 02/14/2024    PROTIME 16.5 (H) 02/14/2024     Condition on Discharge:  stable    Vital Signs  Temp:  [98.1 °F (36.7 °C)-99.4 °F (37.4 °C)] 98.1 °F (36.7 °C)  Heart Rate:  []  107  Resp:  [16] 16  BP: (107-132)/(69-81) 109/69      Discharge Disposition  Home-Health Care [facility]    Discharge Medications     Discharge Medications        New Medications        Instructions Start Date   aspirin 81 MG EC tablet   81 mg, Oral, Daily   Start Date: February 18, 2024     furosemide 20 MG tablet  Commonly known as: LASIX   20 mg, Oral, Daily   Start Date: February 18, 2024     HYDROcodone-acetaminophen 5-325 MG per tablet  Commonly known as: NORCO   1 tablet, Oral, Every 4 Hours PRN      Metoprolol Tartrate 37.5 MG tablet   37.5 mg, Oral, Every 12 Hours Scheduled      potassium chloride 20 MEQ CR tablet  Commonly known as: K-DUR,KLOR-CON   20 mEq, Oral, Daily             Continue These Medications        Instructions Start Date   fish oil 1000 MG capsule capsule   1,200 mg, Oral, Daily With Breakfast      multivitamin with minerals tablet tablet   1 tablet, Oral, Daily      VITAMIN C ER PO   1 tablet, Oral, Daily             Stop These Medications      sildenafil 25 MG tablet  Commonly known as: VIAGRA              Discharge Diet:     Activity at Discharge:   1. No driving for 2 weeks and off narcotic pain medications.  2. Shower daily. Clean incisions with warm water and antibacterial soap only. Do not put any lotion or ointments on incisions.  3. Ambulate for 10 minutes at least 3 times a day.  4. No heavy lifting > 10lbs until seen in office.   5. Take all medications as prescribed.      Follow-up Appointments  Future Appointments   Date Time Provider Department Center   3/13/2024  1:15 PM Tami Dupree APRN [facility]     Additional Instructions for the Follow-ups that You Need to Schedule       Ambulatory Referral to Cardiac Rehab   As directed      Ambulatory Referral to Home Health ([facility])   As directed      Face to Face Visit Date: 2/17/2024   Follow-up provider for Plan of Care?: I will be treating the patient on an ongoing basis.  Please send me the Plan of Care for signature.    Follow-up provider: SARA VINCENT [[id number]]   Reason/Clinical Findings: post-op open heart   Describe mobility limitations that make leaving home difficult: post-operative weakness   Nursing/Therapeutic Services Requested: Skilled Nursing   Skilled nursing orders: Post CABG care   Frequency: 1 Week 1        Call MD With Problems / Concerns   As directed      Instructions:  Call office at [phone] for any drainage, increased redness, or fever over 100.5    Order Comments: Instructions:  Call office at [phone] for any drainage, increased redness, or fever over 100.5         Discharge Follow-up with PCP   As directed       Currently Documented PCP:    George, Alexander Bancroft, MD    PCP Phone Number:    [phone]     Follow Up Details: in 1 week        Discharge Follow-up with Specified Provider: Cardiologist--Dr. Hwang; 3 Weeks   As directed      To: Cardiologist--Dr. Hwang   Follow Up: 3 Weeks   Follow Up Details: call for appointment, bring all medication bottles to appointment        Discharge Follow-up with Specified Provider: Dr. Vincent; 2 Weeks   As directed      To: Dr. Vincent   Follow Up: 2 Weeks   Follow Up Details: bring all current medications to appointment                Test Results Pending at Discharge       ERIN Gutierrez  02/17/24  10:58 EST

## 2024-02-17 VITALS
BODY MASS INDEX: 26.6 KG/M2 | HEART RATE: 107 BPM | TEMPERATURE: 98.1 F | RESPIRATION RATE: 16 BRPM | OXYGEN SATURATION: 87 % | HEIGHT: 72 IN | SYSTOLIC BLOOD PRESSURE: 109 MMHG | WEIGHT: 196.4 LBS | DIASTOLIC BLOOD PRESSURE: 69 MMHG

## 2024-02-17 LAB
ANION GAP SERPL CALCULATED.3IONS-SCNC: 9 MMOL/L (ref 5–15)
BUN SERPL-MCNC: 13 MG/DL (ref 8–23)
BUN/CREAT SERPL: 13.4 (ref 7–25)
CALCIUM SPEC-SCNC: 8.4 MG/DL (ref 8.6–10.5)
CHLORIDE SERPL-SCNC: 100 MMOL/L (ref 98–107)
CO2 SERPL-SCNC: 27 MMOL/L (ref 22–29)
CREAT SERPL-MCNC: 0.97 MG/DL (ref 0.76–1.27)
DEPRECATED RDW RBC AUTO: 43.5 FL (ref 37–54)
EGFRCR SERPLBLD CKD-EPI 2021: 84 ML/MIN/1.73
ERYTHROCYTE [DISTWIDTH] IN BLOOD BY AUTOMATED COUNT: 13.1 % (ref 12.3–15.4)
GLUCOSE SERPL-MCNC: 94 MG/DL (ref 65–99)
HCT VFR BLD AUTO: 35.8 % (ref 37.5–51)
HGB BLD-MCNC: 11.8 G/DL (ref 13–17.7)
MCH RBC QN AUTO: 29.7 PG (ref 26.6–33)
MCHC RBC AUTO-ENTMCNC: 33 G/DL (ref 31.5–35.7)
MCV RBC AUTO: 90.2 FL (ref 79–97)
PLATELET # BLD AUTO: 146 10*3/MM3 (ref 140–450)
PMV BLD AUTO: 9.8 FL (ref 6–12)
POTASSIUM SERPL-SCNC: 3.7 MMOL/L (ref 3.5–5.2)
RBC # BLD AUTO: 3.97 10*6/MM3 (ref 4.14–5.8)
SODIUM SERPL-SCNC: 136 MMOL/L (ref 136–145)
WBC NRBC COR # BLD AUTO: 6.08 10*3/MM3 (ref 3.4–10.8)

## 2024-02-17 PROCEDURE — 80048 BASIC METABOLIC PNL TOTAL CA: CPT | Performed by: NURSE PRACTITIONER

## 2024-02-17 PROCEDURE — 85027 COMPLETE CBC AUTOMATED: CPT | Performed by: NURSE PRACTITIONER

## 2024-02-17 RX ORDER — FUROSEMIDE 20 MG/1
20 TABLET ORAL DAILY
Qty: 14 TABLET | Refills: 0 | Status: SHIPPED | OUTPATIENT
Start: 2024-02-18 | End: 2024-03-03

## 2024-02-17 RX ORDER — POTASSIUM CHLORIDE 20 MEQ/1
20 TABLET, EXTENDED RELEASE ORAL DAILY
Qty: 14 TABLET | Refills: 0 | Status: SHIPPED | OUTPATIENT
Start: 2024-02-17 | End: 2024-03-02

## 2024-02-17 RX ORDER — POTASSIUM CHLORIDE 750 MG/1
20 TABLET, FILM COATED, EXTENDED RELEASE ORAL ONCE
Status: COMPLETED | OUTPATIENT
Start: 2024-02-17 | End: 2024-02-17

## 2024-02-17 RX ORDER — HYDROCODONE BITARTRATE AND ACETAMINOPHEN 5; 325 MG/1; MG/1
1 TABLET ORAL EVERY 4 HOURS PRN
Qty: 42 TABLET | Refills: 0 | Status: SHIPPED | OUTPATIENT
Start: 2024-02-17 | End: 2024-02-24

## 2024-02-17 RX ORDER — ASPIRIN 81 MG/1
81 TABLET ORAL DAILY
Qty: 30 TABLET | Refills: 2 | Status: SHIPPED | OUTPATIENT
Start: 2024-02-18 | End: 2024-05-18

## 2024-02-17 RX ADMIN — METOPROLOL TARTRATE 37.5 MG: 25 TABLET, FILM COATED ORAL at 08:25

## 2024-02-17 RX ADMIN — POTASSIUM CHLORIDE 20 MEQ: 750 TABLET, EXTENDED RELEASE ORAL at 05:15

## 2024-02-17 RX ADMIN — FUROSEMIDE 20 MG: 20 TABLET ORAL at 08:25

## 2024-02-17 RX ADMIN — ASPIRIN 81 MG: 81 TABLET, COATED ORAL at 08:25

## 2024-02-17 RX ADMIN — GUAIFENESIN 1200 MG: 600 TABLET, EXTENDED RELEASE ORAL at 08:25

## 2024-02-17 RX ADMIN — PANTOPRAZOLE SODIUM 40 MG: 40 TABLET, DELAYED RELEASE ORAL at 05:15

## 2024-02-17 NOTE — DISCHARGE INSTR - ACTIVITY
Activity at Discharge:   1. No driving for 2 weeks and off narcotic pain medications.  2. Shower daily. Clean incisions with warm water and antibacterial soap only. Do not put any lotion or ointments on incisions.  3. Ambulate for 10 minutes at least 3 times a day.  4. No heavy lifting > 10lbs until seen in office.   5. Take all medications as prescribed.

## 2024-02-18 ENCOUNTER — READMISSION MANAGEMENT (OUTPATIENT)
Dept: CALL CENTER | Facility: HOSPITAL | Age: 69
End: 2024-02-18
Payer: MEDICARE

## 2024-02-18 LAB
BH BB BLOOD EXPIRATION DATE: NORMAL
BH BB BLOOD EXPIRATION DATE: NORMAL
BH BB BLOOD TYPE BARCODE: 5100
BH BB BLOOD TYPE BARCODE: 5100
BH BB DISPENSE STATUS: NORMAL
BH BB DISPENSE STATUS: NORMAL
BH BB PRODUCT CODE: NORMAL
BH BB PRODUCT CODE: NORMAL
BH BB UNIT NUMBER: NORMAL
BH BB UNIT NUMBER: NORMAL
CROSSMATCH INTERPRETATION: NORMAL
CROSSMATCH INTERPRETATION: NORMAL
UNIT  ABO: NORMAL
UNIT  ABO: NORMAL
UNIT  RH: NORMAL
UNIT  RH: NORMAL

## 2024-02-21 ENCOUNTER — READMISSION MANAGEMENT (OUTPATIENT)
Dept: CALL CENTER | Facility: HOSPITAL | Age: 69
End: 2024-02-21
Payer: MEDICARE

## 2024-02-21 NOTE — OUTREACH NOTE
CT Surgery Week 1 Survey      Flowsheet Row Responses   [facility] patient discharged from? [place]   Does the patient have one of the following disease processes/diagnoses(primary or secondary)? Cardiothoracic surgery   Week 1 attempt successful? Yes   Call start time 1509   Call end time 1520   Discharge diagnosis severe aortic stenosis, s/p AVR with mini J sternotomy  - hyperlipidemia   Person spoke with today (if not patient) and relationship Patient   Meds reviewed with patient/caregiver? Yes   Does the patient have all medications related to this admission filled (includes all antibiotics, pain medications, cardiac medications, etc.) Yes   Is the patient taking all medications as directed (includes completed medication regime)? Yes   Does the patient have a primary care provider?  Yes   Does the patient have an appointment scheduled with their C/T surgeon? Yes  [2/27]   Comments regarding PCP Follow up with Freedom Guan PCP   Has the patient kept scheduled appointments due by today? N/A   What is the Home health agency?  [facility]   Has home health visited the patient within 72 hours of discharge? Yes   Psychosocial issues? No   Did the patient receive a copy of their discharge instructions? Yes   Nursing interventions Reviewed instructions with patient   What is the patient's perception of their health status since discharge? Improving   Is the patient /caregiver able to teach back basic post-op care? Continue use of incentive spirometry at least 1 week post discharge, Practice cough and deep breath every 4 hours while awake, Hold pillow to support chest when coughing, Drive as instructed by MD in discharge instructions, Shower daily, Use a clean wash cloth and antibacterial bar or liquid soap to clean incisions, Lifting as instructed by MD in discharge instructions   Is the patient/caregiver able to teach back signs and symptoms of incisional infection? Increased  redness, swelling or pain at the incisonal site, Increased drainage or bleeding, Pus or odor from incision, Fever   Is the patient/caregiver able to teach back steps to recovery at home? Set small, achievable goals for return to baseline health, Rest and rebuild strength, gradually increase activity, Eat a well-balance diet   Is the patient /caregiver able to teach back the importance of cardiac rehab? Yes   Nursing interventions Provided education on importance of cardiac rehab   If the patient is a current smoker, are they able to teach back resources for cessation? Not a smoker   Is the patient/caregiver able to teach back the hierarchy of who to call/visit for symptoms/problems? PCP, Specialist, Home health nurse, Urgent Care, ED, 911 Yes   Week 1 call completed? Yes   Is the patient interested in additional calls from an ambulatory ? No   Would this patient benefit from a Referral to [facility] Social Work? No   Call end time [phone]              JUDY SARABIA - Registered Nurse

## 2024-02-27 ENCOUNTER — OFFICE VISIT (OUTPATIENT)
Dept: CARDIAC SURGERY | Facility: CLINIC | Age: 69
End: 2024-02-27
Payer: MEDICARE

## 2024-02-27 VITALS
RESPIRATION RATE: 18 BRPM | BODY MASS INDEX: 25.73 KG/M2 | DIASTOLIC BLOOD PRESSURE: 84 MMHG | TEMPERATURE: 97.3 F | SYSTOLIC BLOOD PRESSURE: 128 MMHG | WEIGHT: 190 LBS | HEART RATE: 85 BPM | OXYGEN SATURATION: 97 % | HEIGHT: 72 IN

## 2024-02-27 DIAGNOSIS — Z95.2 S/P AVR: Primary | ICD-10-CM

## 2024-02-27 PROCEDURE — 99024 POSTOP FOLLOW-UP VISIT: CPT | Performed by: NURSE PRACTITIONER

## 2024-02-27 PROCEDURE — 1160F RVW MEDS BY RX/DR IN RCRD: CPT | Performed by: NURSE PRACTITIONER

## 2024-02-27 PROCEDURE — 1159F MED LIST DOCD IN RCRD: CPT | Performed by: NURSE PRACTITIONER

## 2024-02-27 NOTE — PROGRESS NOTES
"CARDIOVASCULAR SURGERY FOLLOW-UP PROGRESS NOTE  Chief Complaint: Post-op Follow Up        HPI:   Dear Dr. Guan, Alexander Bancroft, MD and colleagues:    It was nice to see Tmio Aceves in follow up today after cardiac surgery.  As you know, he is a 68 y.o. male with severe aortic stenosis, HLD, and  arthritis who underwent minimally invasive (upper J sternotomy) elective AVR with a 27 mm Magna pericardial prosthesis at [facility] by Dr. Vincent on 2/13/2024. He did well postoperatively and continues to do well. He comes in today wanting to start driving.   He is not taking pain meds.  He is requesting a refill on his metoprolol tartrate.  His activity level has been good. He has not seen cardiology.  He has not started cardiac rehab.  His wife and son are with him today for his appt.    Physical Exam:         /84 (BP Location: Left arm, Patient Position: Sitting, Cuff Size: Adult)   Pulse 85   Temp 97.3 °F (36.3 °C)   Resp 18   Ht 182.9 cm (72\")   Wt 86.2 kg (190 lb)   SpO2 97%   BMI 25.77 kg/m²   Heart:  regular rate and rhythm  Lungs:  clear to auscultation bilaterally  Extremities:  no edema  Incision(s):  proximal chest healing well, sternum stable    Assessment/Plan:     S/P minimally invasive tissue AVR (27 mm Magna). Overall, he is doing well.  He is quite the character.  He can start driving short distances. He should continue sternal precautions.  We did discuss his lipid panel--Tchol 219, .  He is going to restart his fish oils and increase fish in his diet that is not fried and watch the amount of cheese that he eats.  I encouraged him to d/w lipid panel with Dr. Hwang.  I refilled his metoprolol rx.  He is almost finished with Lasix and potassium.  He is euvolemic.  I do not think he needs to continue diuretics when this rx is completed.    No significant post-op complications    Keep incisions clean and dry  OK to drive if not taking narcotic pain medicine  OK to begin " cardiac rehab  Follow-up as scheduled with cardiology  Follow-up as scheduled with PCP  Follow-up with CT surgery prn    Continue lifting restriction of 10 lbs until 6 weeks and 50 lbs until 12 weeks from the date of surgery, no excessive jarring motions or twisting motions until 12 weeks from the date of surgery.    Thank you for allowing me to participate in the care of your patient.    Regards,  Nessa Martinez, APRN

## 2024-03-01 ENCOUNTER — READMISSION MANAGEMENT (OUTPATIENT)
Dept: CALL CENTER | Facility: HOSPITAL | Age: 69
End: 2024-03-01
Payer: MEDICARE

## 2024-03-01 NOTE — OUTREACH NOTE
CT Surgery Week 2 Survey      Flowsheet Row Responses   [facility] patient discharged from? [place]   Does the patient have one of the following disease processes/diagnoses(primary or secondary)? Cardiothoracic surgery   Week 2 attempt successful? No   Unsuccessful attempts Attempt 1            Jacqueline Gayle Registered Nurse

## 2024-03-05 ENCOUNTER — OFFICE VISIT (OUTPATIENT)
Dept: CARDIOLOGY | Facility: CLINIC | Age: 69
End: 2024-03-05
Payer: MEDICARE

## 2024-03-05 VITALS
DIASTOLIC BLOOD PRESSURE: 85 MMHG | SYSTOLIC BLOOD PRESSURE: 124 MMHG | BODY MASS INDEX: 27.36 KG/M2 | HEART RATE: 74 BPM | WEIGHT: 202 LBS | HEIGHT: 72 IN

## 2024-03-05 DIAGNOSIS — I35.0 AORTIC STENOSIS, SEVERE: Primary | ICD-10-CM

## 2024-03-05 PROCEDURE — 1159F MED LIST DOCD IN RCRD: CPT | Performed by: INTERNAL MEDICINE

## 2024-03-05 PROCEDURE — 99213 OFFICE O/P EST LOW 20 MIN: CPT | Performed by: INTERNAL MEDICINE

## 2024-03-05 PROCEDURE — 1160F RVW MEDS BY RX/DR IN RCRD: CPT | Performed by: INTERNAL MEDICINE

## 2024-03-05 RX ORDER — METOPROLOL SUCCINATE 25 MG/1
25 TABLET, EXTENDED RELEASE ORAL DAILY
Qty: 90 TABLET | Refills: 3 | Status: SHIPPED | OUTPATIENT
Start: 2024-03-05

## 2024-03-05 NOTE — PROGRESS NOTES
Chief Complaint  Aortic stenosis, severe    Subjective            Timo Aceves presents to [facility] CARDIOLOGY  History of Present Illness    Emiliano is here for follow-up evaluation management of aortic stenosis with surgical AVR about 3 weeks ago.  He is doing very well so far.  He continues to have some sternal soreness but it is improving.  He is getting back into his normal activities.  He followed up with CT surgery recently and was doing well.    [facility]  Past Medical History:   Diagnosis Date    Arthritis     Coronary artery disease     AORTIC STENOSIS    Elevated cholesterol     Heart murmur     Hyperlipidemia          SURGICALHX  Past Surgical History:   Procedure Laterality Date    AORTIC VALVE REPAIR/REPLACEMENT N/A 2/13/2024    Procedure: AORTIC VALVE REPLACEMENT with mini-sternotomy TRANSESOPHAGEAL ECHOCARDIOGRAM WITH ANESTHESIA AND PRP;  Surgeon: Joradn Vincent MD;  Location: [facility];  Service: Cardiothoracic;  Laterality: N/A;    CARDIAC CATHETERIZATION N/A 11/2/2023    Procedure: Coronaries only;  Surgeon: MAVIS Hwang MD;  Location: [facility] INVASIVE LOCATION;  Service: Cardiology;  Laterality: N/A;    ORIF CLAVICLE FRACTURE Right         SOC  Social History     Socioeconomic History    Marital status:    Tobacco Use    Smoking status: Former     Types: Cigarettes, Pipe    Smokeless tobacco: Never    Tobacco comments:     Pipe only in college   Vaping Use    Vaping status: Never Used   Substance and Sexual Activity    Alcohol use: Yes     Comment: 2 monthly    Drug use: Never    Sexual activity: Defer         FAMHX  Family History   Problem Relation Age of Onset    No Known Problems Mother     No Known Problems Father     Aneurysm Paternal Uncle     Malig Hyperthermia Neg Hx           ALLERGY  No Known Allergies     MEDSCURRENT    Current Outpatient Medications:     Ascorbic Acid (VITAMIN C ER PO), Take 1 tablet by mouth Daily., Disp: , Rfl:     aspirin 81 MG  "EC tablet, Take 1 tablet by mouth Daily for 90 days., Disp: 30 tablet, Rfl: 2    furosemide (LASIX) 20 MG tablet, Take 1 tablet by mouth Daily for 14 days., Disp: 14 tablet, Rfl: 0    multivitamin with minerals tablet tablet, Take 1 tablet by mouth Daily., Disp: , Rfl:     Omega-3 Fatty Acids (fish oil) 1000 MG capsule capsule, Take 1,200 mg by mouth Daily With Breakfast., Disp: , Rfl:     metoprolol succinate XL (TOPROL-XL) 25 MG 24 hr tablet, Take 1 tablet by mouth Daily., Disp: 90 tablet, Rfl: 3      Review of Systems   Cardiovascular:  Negative for chest pain and dyspnea on exertion.   Respiratory:  Negative for shortness of breath.    Musculoskeletal:         Expected sternal soreness        Objective     /85   Pulse 74   Ht 182.9 cm (72\")   Wt 91.6 kg (202 lb)   BMI 27.40 kg/m²       General Appearance:   well developed  well nourished  HENT:   oropharynx moist  lips not cyanotic  Neck:  thyroid not enlarged  supple  Respiratory:  no respiratory distress  normal breath sounds  no rales  Cardiovascular:  no jugular venous distention  regular rhythm  apical impulse normal  S1 normal, S2 normal  no S3, no S4   no murmur  no rub, no thrill  carotid pulses normal; no bruit  pedal pulses normal  lower extremity edema: none    Musculoskeletal:  no clubbing of fingers.   normocephalic, head atraumatic  Skin:   warm, dry, sternum healing well  Psychiatric:  judgement and insight appropriate  normal mood and affect      Result Review :                  Procedures              Assessment and Plan        ASSESSMENT:  Encounter Diagnosis   Name Primary?    Aortic stenosis, severe Yes         PLAN:    1.  Severe aortic stenosis status postsurgical AVR about 3 weeks ago.  He is doing very well thus far.  We discussed cardiac rehab but with his typical active lifestyle and work schedule it does not seem practical at this point for him to work that in.  We will revisit this as needed.  We will repeat an echo in mid " April for baseline.  2.  Follow-up in about 4 months          Patient was given instructions and counseling regarding his condition or for health maintenance advice. Please see specific information pulled into the AVS if appropriate.             MAVIS Hwang MD  3/5/2024    09:31 EST

## 2024-03-06 ENCOUNTER — READMISSION MANAGEMENT (OUTPATIENT)
Dept: CALL CENTER | Facility: HOSPITAL | Age: 69
End: 2024-03-06
Payer: MEDICARE

## 2024-03-06 NOTE — OUTREACH NOTE
CT Surgery Week 2 Survey      Flowsheet Row Responses   [facility] patient discharged from? [place]   Does the patient have one of the following disease processes/diagnoses(primary or secondary)? Cardiothoracic surgery   Week 2 attempt successful? No   Unsuccessful attempts Attempt 2            Jacqueline DICKENS - Registered Nurse

## 2024-03-15 ENCOUNTER — READMISSION MANAGEMENT (OUTPATIENT)
Dept: CALL CENTER | Facility: HOSPITAL | Age: 69
End: 2024-03-15
Payer: MEDICARE

## 2024-03-15 NOTE — OUTREACH NOTE
CT Surgery Week 3 Survey      Flowsheet Row Responses   [facility] [place] [facility] [place]   Does the patient have one of the following disease processes/diagnoses(primary or secondary)? Cardiothoracic surgery   Week 3 attempt successful? No   Unsuccessful attempts Attempt 1  [attempted both patient and spouse numbers, unsuccessful.]   Revoke Decline to participate  [attempted to contact x 3,unsuccessful. Revoked per unit policy.]            Mary HORNE - Registered Nurse

## 2024-03-26 ENCOUNTER — TELEPHONE (OUTPATIENT)
Dept: CARDIOLOGY | Facility: CLINIC | Age: 69
End: 2024-03-26
Payer: MEDICARE

## 2024-03-26 NOTE — TELEPHONE ENCOUNTER
Tel pt and left voice mail with appointment information for echo and left call back phone number   
Oriented - self; Oriented - place; Oriented - time

## 2024-04-22 DIAGNOSIS — I35.0 AORTIC STENOSIS, SEVERE: ICD-10-CM

## 2024-04-23 ENCOUNTER — TELEPHONE (OUTPATIENT)
Dept: CARDIOLOGY | Facility: CLINIC | Age: 69
End: 2024-04-23
Payer: MEDICARE

## 2024-04-23 NOTE — TELEPHONE ENCOUNTER
----- Message from MAVIS Hwang MD sent at 4/22/2024 12:46 PM EDT -----  Echo reviewed  Heart function was normal  Valve function was normal, the prosthetic aortic valve replacement is functioning normally    Follow-up as scheduled

## 2024-07-16 ENCOUNTER — OFFICE VISIT (OUTPATIENT)
Dept: CARDIOLOGY | Facility: CLINIC | Age: 69
End: 2024-07-16
Payer: MEDICARE

## 2024-07-16 VITALS
DIASTOLIC BLOOD PRESSURE: 86 MMHG | HEIGHT: 72 IN | HEART RATE: 76 BPM | BODY MASS INDEX: 28.15 KG/M2 | SYSTOLIC BLOOD PRESSURE: 125 MMHG | WEIGHT: 207.8 LBS

## 2024-07-16 DIAGNOSIS — E78.2 HYPERLIPEMIA, MIXED: ICD-10-CM

## 2024-07-16 DIAGNOSIS — I35.0 AORTIC STENOSIS, SEVERE: Primary | ICD-10-CM

## 2024-07-16 DIAGNOSIS — Z95.3 S/P AORTIC VALVE REPLACEMENT WITH BIOPROSTHETIC VALVE: ICD-10-CM

## 2024-07-16 PROCEDURE — 99214 OFFICE O/P EST MOD 30 MIN: CPT | Performed by: INTERNAL MEDICINE

## 2024-07-16 PROCEDURE — 1159F MED LIST DOCD IN RCRD: CPT | Performed by: INTERNAL MEDICINE

## 2024-07-16 PROCEDURE — 1160F RVW MEDS BY RX/DR IN RCRD: CPT | Performed by: INTERNAL MEDICINE

## 2024-07-16 RX ORDER — ASPIRIN 81 MG/1
81 TABLET, CHEWABLE ORAL DAILY
COMMUNITY

## 2024-07-16 RX ORDER — ROSUVASTATIN CALCIUM 20 MG/1
20 TABLET, COATED ORAL DAILY
Qty: 90 TABLET | Refills: 3 | Status: SHIPPED | OUTPATIENT
Start: 2024-07-16

## 2024-07-16 NOTE — PROGRESS NOTES
Chief Complaint  4 month follow up  and Aortic stenosis, severe    Subjective            Timo Aceves presents to [facility] CARDIOLOGY  History of Present Illness    Emiliano is here for follow-up evaluation management of aortic stenosis with bioprosthetic AVR.  Overall he is doing well.  He is working full-time.  He denies chest pain, excessive shortness of breath or palpitations.    PMH  Past Medical History:   Diagnosis Date    Arthritis     Coronary artery disease     AORTIC STENOSIS    Elevated cholesterol     Heart murmur     Hyperlipidemia          SURGICALHX  Past Surgical History:   Procedure Laterality Date    AORTIC VALVE REPAIR/REPLACEMENT N/A 2/13/2024    Procedure: AORTIC VALVE REPLACEMENT with mini-sternotomy TRANSESOPHAGEAL ECHOCARDIOGRAM WITH ANESTHESIA AND PRP;  Surgeon: Jordan Vincent MD;  Location: [facility];  Service: Cardiothoracic;  Laterality: N/A;    CARDIAC CATHETERIZATION N/A 11/2/2023    Procedure: Coronaries only;  Surgeon: MAVIS Hwang MD;  Location: [facility] CATH INVASIVE LOCATION;  Service: Cardiology;  Laterality: N/A;    ORIF CLAVICLE FRACTURE Right         SOC  Social History     Socioeconomic History    Marital status:    Tobacco Use    Smoking status: Former     Types: Cigarettes, Pipe    Smokeless tobacco: Never    Tobacco comments:     Pipe only in college   Vaping Use    Vaping status: Never Used   Substance and Sexual Activity    Alcohol use: Yes     Comment: 2 monthly    Drug use: Never    Sexual activity: Defer         FAMHX  Family History   Problem Relation Age of Onset    No Known Problems Mother     No Known Problems Father     Aneurysm Paternal Uncle     Malig Hyperthermia Neg Hx           ALLERGY  No Known Allergies     MEDSCURRENT    Current Outpatient Medications:     aspirin 81 MG chewable tablet, Chew 1 tablet Daily., Disp: , Rfl:     multivitamin with minerals tablet tablet, Take 1 tablet by mouth Daily., Disp: , Rfl:     Omega-3  "Fatty Acids (fish oil) 1000 MG capsule capsule, Take 1,200 mg by mouth Daily With Breakfast., Disp: , Rfl:     Ascorbic Acid (VITAMIN C ER PO), Take 1 tablet by mouth Daily. (Patient not taking: Reported on 7/16/2024), Disp: , Rfl:     furosemide (LASIX) 20 MG tablet, Take 1 tablet by mouth Daily for 14 days. (Patient not taking: Reported on 7/16/2024), Disp: 14 tablet, Rfl: 0      Review of Systems   Cardiovascular:  Negative for chest pain and dyspnea on exertion.   Respiratory:  Negative for shortness of breath.         Objective     /86   Pulse 76   Ht 182.9 cm (72\")   Wt 94.3 kg (207 lb 12.8 oz)   BMI 28.18 kg/m²       General Appearance:   well developed  well nourished  HENT:   oropharynx moist  lips not cyanotic  Neck:  thyroid not enlarged  supple  Respiratory:  no respiratory distress  normal breath sounds  no rales  Cardiovascular:  no jugular venous distention  regular rhythm  apical impulse normal  S1 normal, S2 normal  no S3, no S4   no murmur  no rub, no thrill  carotid pulses normal; no bruit  pedal pulses normal  lower extremity edema: none    Musculoskeletal:  no clubbing of fingers.   normocephalic, head atraumatic  Skin:   warm, dry, sternum healing well  Psychiatric:  judgement and insight appropriate  normal mood and affect      Result Review :         Laboratory studies reviewed, most recent          Procedures              Assessment and Plan        ASSESSMENT:  Encounter Diagnoses   Name Primary?    Aortic stenosis, severe Yes    S/P aortic valve replacement with bioprosthetic valve     Hyperlipemia, mixed          PLAN:    1.  Severe aortic stenosis-status post bioprosthetic AVR.  He is doing well.  He has no complaints.  His echo in April showed normal AVR function.  Continue aspirin prevention.  He wants to come off of metoprolol.  At this time there is no strong indication to keep him on that.  So he will wean off over the next week.  We did discuss today antibiotic " prophylaxis for invasive dental work other than routine cleanings.  2.  Mixed hyperlipidemia-based on the patient's cardiovascular risk profile and the LDL of 180 I do recommend statin therapy.        Follow-up in about 8 months    Patient was given instructions and counseling regarding his condition or for health maintenance advice. Please see specific information pulled into the AVS if appropriate.             MAVIS Hwang MD  7/16/2024    10:16 EDT

## 2025-03-18 ENCOUNTER — OFFICE VISIT (OUTPATIENT)
Dept: CARDIOLOGY | Facility: CLINIC | Age: 70
End: 2025-03-18
Payer: MEDICARE

## 2025-03-18 VITALS
BODY MASS INDEX: 27.5 KG/M2 | SYSTOLIC BLOOD PRESSURE: 134 MMHG | DIASTOLIC BLOOD PRESSURE: 86 MMHG | HEIGHT: 72 IN | WEIGHT: 203 LBS | HEART RATE: 76 BPM

## 2025-03-18 DIAGNOSIS — E78.2 HYPERLIPEMIA, MIXED: ICD-10-CM

## 2025-03-18 DIAGNOSIS — Z95.3 S/P AORTIC VALVE REPLACEMENT WITH BIOPROSTHETIC VALVE: Primary | ICD-10-CM

## 2025-03-18 RX ORDER — ATORVASTATIN CALCIUM 10 MG/1
10 TABLET, FILM COATED ORAL DAILY
COMMUNITY

## 2025-03-18 NOTE — PROGRESS NOTES
Chief Complaint  Hyperlipidemia and aortic stenosis, severe    Subjective            Timo Aceves presents to [facility] CARDIOLOGY      Emiliano is here for follow-up evaluation management of aortic stenosis with bioprosthetic AVR.  Overall he is doing well.  He is working full-time.  He denies chest pain, excessive shortness of breath or palpitations.    PMH  Past Medical History:   Diagnosis Date    Arthritis     Coronary artery disease     AORTIC STENOSIS    Elevated cholesterol     Heart murmur     Hyperlipidemia          SURGICALHX  Past Surgical History:   Procedure Laterality Date    AORTIC VALVE REPAIR/REPLACEMENT N/A 2/13/2024    Procedure: AORTIC VALVE REPLACEMENT with mini-sternotomy TRANSESOPHAGEAL ECHOCARDIOGRAM WITH ANESTHESIA AND PRP;  Surgeon: Jordan Vincent MD;  Location: [facility] CVOR;  Service: Cardiothoracic;  Laterality: N/A;    CARDIAC CATHETERIZATION N/A 11/2/2023    Procedure: Coronaries only;  Surgeon: MAVIS Hwang MD;  Location: [facility] CATH INVASIVE LOCATION;  Service: Cardiology;  Laterality: N/A;    ORIF CLAVICLE FRACTURE Right         SOC  Social History     Socioeconomic History    Marital status:    Tobacco Use    Smoking status: Former     Types: Cigarettes, Pipe    Smokeless tobacco: Never    Tobacco comments:     Pipe only in college   Vaping Use    Vaping status: Never Used   Substance and Sexual Activity    Alcohol use: Yes     Comment: 2 monthly    Drug use: Never    Sexual activity: Defer         FAMHX  Family History   Problem Relation Age of Onset    No Known Problems Mother     No Known Problems Father     Aneurysm Paternal Uncle     Malig Hyperthermia Neg Hx           ALLERGY  No Known Allergies     MEDSCURRENT    Current Outpatient Medications:     aspirin 81 MG chewable tablet, Chew 1 tablet Daily., Disp: , Rfl:     atorvastatin (LIPITOR) 10 MG tablet, Take 1 tablet by mouth Daily., Disp: , Rfl:     multivitamin with minerals tablet tablet,  "Take 1 tablet by mouth Daily., Disp: , Rfl:     Omega-3 Fatty Acids (fish oil) 1000 MG capsule capsule, Take 1,200 mg by mouth Daily With Breakfast., Disp: , Rfl:     rosuvastatin (CRESTOR) 20 MG tablet, Take 1 tablet by mouth Daily., Disp: 90 tablet, Rfl: 3    Ascorbic Acid (VITAMIN C ER PO), Take 1 tablet by mouth Daily. (Patient not taking: Reported on 7/16/2024), Disp: , Rfl:     furosemide (LASIX) 20 MG tablet, Take 1 tablet by mouth Daily for 14 days. (Patient not taking: Reported on 7/16/2024), Disp: 14 tablet, Rfl: 0      Review of Systems   Cardiovascular:  Negative for chest pain and dyspnea on exertion.   Respiratory:  Negative for shortness of breath.         Objective     /86   Pulse 76   Ht 182.9 cm (72\")   Wt 92.1 kg (203 lb)   BMI 27.53 kg/m²       General Appearance:   well developed  well nourished  HENT:   oropharynx moist  lips not cyanotic  Neck:  thyroid not enlarged  supple  Respiratory:  no respiratory distress  normal breath sounds  no rales  Cardiovascular:  no jugular venous distention  regular rhythm  apical impulse normal  S1 normal, S2 normal  no S3, no S4   no murmur  no rub, no thrill  carotid pulses normal; no bruit  pedal pulses normal  lower extremity edema: none    Musculoskeletal:  no clubbing of fingers.   normocephalic, head atraumatic  Skin:   warm, dry, sternum healing well  Psychiatric:  judgement and insight appropriate  normal mood and affect      Result Review :         Primary care records reviewed, laboratory studies reviewed         Procedures              Assessment and Plan        ASSESSMENT:  Encounter Diagnoses   Name Primary?    S/P aortic valve replacement with bioprosthetic valve Yes    Hyperlipemia, mixed          PLAN:    1.  Severe aortic stenosis-status post bioprosthetic AVR.  He is doing well.  He has no complaints.  Continue routine clinical follow-up  2.  Mixed hyperlipidemia-stable, continue statin therapy      Follow-up annually    Patient " was given instructions and counseling regarding his condition or for health maintenance advice. Please see specific information pulled into the AVS if appropriate.             MAVIS Hwang MD  3/18/2025    10:44 EDT

## (undated) DEVICE — Device

## (undated) DEVICE — GLIDESHEATH SLENDER ACCESS KIT: Brand: GLIDESHEATH SLENDER

## (undated) DEVICE — DRP SLUSH WARMR MACH RECTG 66X44IN

## (undated) DEVICE — TBG ART PRESS 60 IN

## (undated) DEVICE — DRSNG WND GZ PAD BORDERED 4X8IN STRL

## (undated) DEVICE — MARKR SKIN W/RULR AND LBL

## (undated) DEVICE — SPNG DISSCT BLUNT/K XRAY/DETECTABLE 9/16X1/4IN STRL PK/5

## (undated) DEVICE — ST TOURNI COMPL A/ 7IN

## (undated) DEVICE — RADIFOCUS GLIDEWIRE: Brand: GLIDEWIRE

## (undated) DEVICE — DECANT FLD BG 9IN STRL

## (undated) DEVICE — HEMOCONCENTRATOR PERFUS LPS06

## (undated) DEVICE — SPNG GZ WOVN 4X4IN 12PLY 10/BX STRL

## (undated) DEVICE — CONN STR 1/2INX3/8IN

## (undated) DEVICE — CATH LAB PACK: Brand: MEDLINE INDUSTRIES, INC.

## (undated) DEVICE — ADAPT ANTEGRADE RETRGR

## (undated) DEVICE — RADIFOCUS OPTITORQUE ANGIOGRAPHIC CATHETER: Brand: OPTITORQUE

## (undated) DEVICE — CANN ART DLP 1PC EDPA A/ 20F

## (undated) DEVICE — SYS PERFUS SEP PLATLT W TIPS CUST

## (undated) DEVICE — DRSNG BIOPATCH ANTIMICROB 7MM 1IN

## (undated) DEVICE — PK PERFUS CUST W/CARDIOPLEGIA

## (undated) DEVICE — LBL SHET CUST SMOKE 2X2IN YEL EA/PK/200

## (undated) DEVICE — CANN VESL CORNRY STR W/4MM BALN

## (undated) DEVICE — GLV SURG BIOGEL LTX PF 7 1/2

## (undated) DEVICE — CANN AORT ROOT DLP VNT/8IN 14G 7F

## (undated) DEVICE — BG TRANSF W/COUPLER SPK 600ML

## (undated) DEVICE — ORGANIZER SUT SHELIGH 3T 213013

## (undated) DEVICE — PK HEART OPN 40

## (undated) DEVICE — SENSR CERBRL O2 PK/2

## (undated) DEVICE — CABL CONN SURG W/SAFE CONN

## (undated) DEVICE — INSRT LATIS 90MM

## (undated) DEVICE — CANN RETRGR STYLET RSCP 15F

## (undated) DEVICE — PK ATS CUST W CARDIOTOMY RESEVOIR

## (undated) DEVICE — SPNG LAP 12X12IN LF STRL PK/5

## (undated) DEVICE — GW FC FLOP/TP .035 260CM 3MM

## (undated) DEVICE — DRSNG WNDW SORBAVIEW IJ 4.75X4.25IN

## (undated) DEVICE — SOL ISO/ALC 70PCT 4OZ

## (undated) DEVICE — DRSNG SURESITE WNDW 2.38X2.75